# Patient Record
Sex: MALE | Race: WHITE | NOT HISPANIC OR LATINO | Employment: OTHER | URBAN - METROPOLITAN AREA
[De-identification: names, ages, dates, MRNs, and addresses within clinical notes are randomized per-mention and may not be internally consistent; named-entity substitution may affect disease eponyms.]

---

## 2017-01-01 ENCOUNTER — OFFICE VISIT (OUTPATIENT)
Dept: SURGERY | Facility: CLINIC | Age: 32
End: 2017-01-01
Payer: MEDICAID

## 2017-01-01 VITALS — HEART RATE: 97 BPM | DIASTOLIC BLOOD PRESSURE: 64 MMHG | SYSTOLIC BLOOD PRESSURE: 89 MMHG

## 2017-01-01 DIAGNOSIS — R19.09 PRESACRAL MASS: Primary | ICD-10-CM

## 2017-01-01 DIAGNOSIS — K60.3 TRANSSPHINCTERIC ANAL FISTULA: ICD-10-CM

## 2017-01-01 PROCEDURE — 99999 PR PBB SHADOW E&M-EST. PATIENT-LVL III: CPT | Mod: PBBFAC,,, | Performed by: COLON & RECTAL SURGERY

## 2017-01-01 PROCEDURE — 99213 OFFICE O/P EST LOW 20 MIN: CPT | Mod: PBBFAC | Performed by: COLON & RECTAL SURGERY

## 2017-01-01 PROCEDURE — 99213 OFFICE O/P EST LOW 20 MIN: CPT | Mod: S$PBB,,, | Performed by: COLON & RECTAL SURGERY

## 2017-03-23 ENCOUNTER — OFFICE VISIT (OUTPATIENT)
Dept: SURGERY | Facility: CLINIC | Age: 32
End: 2017-03-23
Payer: MEDICAID

## 2017-03-23 VITALS
OXYGEN SATURATION: 97 % | RESPIRATION RATE: 18 BRPM | HEART RATE: 73 BPM | DIASTOLIC BLOOD PRESSURE: 60 MMHG | HEIGHT: 67 IN | SYSTOLIC BLOOD PRESSURE: 90 MMHG

## 2017-03-23 DIAGNOSIS — K62.89: Primary | ICD-10-CM

## 2017-03-23 DIAGNOSIS — K60.0 ACUTE ANAL FISSURE: ICD-10-CM

## 2017-03-23 PROCEDURE — 99214 OFFICE O/P EST MOD 30 MIN: CPT | Mod: S$GLB,,, | Performed by: EMERGENCY MEDICINE

## 2017-03-23 RX ORDER — FAMOTIDINE 20 MG/1
20 TABLET, FILM COATED ORAL 2 TIMES DAILY
Status: ON HOLD | COMMUNITY
End: 2018-01-01 | Stop reason: HOSPADM

## 2017-03-23 RX ORDER — CIPROFLOXACIN 500 MG/5ML
500 KIT ORAL 2 TIMES DAILY
Qty: 70 ML | Refills: 0 | Status: SHIPPED | OUTPATIENT
Start: 2017-03-23 | End: 2017-03-30

## 2017-03-23 RX ORDER — QUETIAPINE FUMARATE 50 MG/1
400 TABLET, FILM COATED ORAL NIGHTLY
COMMUNITY

## 2017-03-23 RX ORDER — LIDOCAINE HYDROCHLORIDE 20 MG/ML
JELLY TOPICAL 3 TIMES DAILY
Qty: 30 ML | Refills: 5 | Status: SHIPPED | OUTPATIENT
Start: 2017-03-23 | End: 2017-11-07

## 2017-03-23 RX ORDER — AMOXICILLIN 250 MG
1 CAPSULE ORAL DAILY
COMMUNITY

## 2017-03-23 NOTE — MR AVS SNAPSHOT
[facility]  [address], [address]  [facility] [address]  Phone: [phone]  Fax: [phone]                  Vinayak Castrejon   3/23/2017 4:15 PM   Office Visit    Description:  Male : 1985   Provider:  ERMA Lynn MD   Department:  [facility]           Reason for Visit     Cyst           Diagnoses this Visit        Comments    Granuloma of rectum    -  Primary     Acute anal fissure                To Do List           Goals (5 Years of Data)     None      Follow-Up and Disposition     Return if symptoms worsen or fail to improve.       These Medications        Disp Refills Start End    lidocaine HCL 2% (XYLOCAINE) 2 % jelly 30 mL 5 3/23/2017     Apply topically 3 (three) times daily. Apply to the rectum prn. - Topical (Top)    Pharmacy: [facility] [address] [place] [address] [id number] Ph #: [phone]       ciprofloxacin (CIPRO) 500 mg/5 mL suspension 70 mL 0 3/23/2017 3/30/2017    Take 5 mLs (500 mg total) by mouth 2 (two) times daily. - Oral    Pharmacy: [facility] [address] [place] [address] [id number] Ph #: [phone]         [facility]     [facility] Care [facility] -  Assistance  Registered nurses in the [facility] provide clinical advisement, health education, appointment booking, and other advisory services.  Call for this free service at [phone].             Medications           Message regarding Medications     Verify the changes and/or additions to your medication regime listed below are the same as discussed with your clinician today.  If any of these changes or additions are incorrect, please notify your healthcare provider.        START taking these NEW medications        Refills    lidocaine HCL 2% (XYLOCAINE) 2 % jelly 5    Sig: Apply topically 3 (three) times daily. Apply to the rectum prn.    Class: Normal    Route: Topical (Top)    ciprofloxacin (CIPRO) 500 mg/5 mL suspension 0     Sig: Take 5 mLs (500 mg total) by mouth 2 (two) times daily.    Class: Normal    Route: Oral      STOP taking these medications     BANOPHEN 25 mg capsule Take 1 capsule by mouth as needed.    docusate sodium (COLACE) 100 MG capsule Take 1 capsule (100 mg total) by mouth 2 (two) times daily.    hydrocodone-acetaminophen 5-325mg (NORCO) 5-325 mg per tablet Take 1 tablet by mouth as needed.    L. RHAMNOSUS GG/INULIN (CULTURELLE PROBIOTICS ORAL) Take 1 capsule by mouth continuous prn.    temazepam (RESTORIL) 15 mg Cap Take 2 capsules by mouth as needed.           Verify that the below list of medications is an accurate representation of the medications you are currently taking.  If none reported, the list may be blank. If incorrect, please contact your healthcare provider. Carry this list with you in case of emergency.           Current Medications     alprazolam (XANAX) 0.25 MG tablet Take 1 tablet by mouth as needed.    apixaban (ELIQUIS) 5 mg Tab Take 5 mg by mouth 2 (two) times daily.    bisacodyl (DULCOLAX) 5 mg EC tablet Take 10 mg by mouth 3 (three) times daily as needed for Constipation.    carvedilol (COREG) 3.125 MG tablet Take 3.125 mg by mouth 2 (two) times daily.    digoxin (LANOXIN) 250 mcg tablet Take 1 tablet (0.25 mg total) by mouth once daily.    famotidine (PEPCID) 20 MG tablet Take 20 mg by mouth 2 (two) times daily.    furosemide (LASIX) 20 MG tablet Take 2 tablets (40 mg total) by mouth 2 (two) times daily.    ondansetron (ZOFRAN-ODT) 4 MG TbDL Take 1 tablet (4 mg total) by mouth every 8 (eight) hours as needed (nausea and vomiting).    quetiapine (SEROQUEL) 50 MG tablet Take 150 mg by mouth 2 (two) times daily.    senna-docusate 8.6-50 mg (PERICOLACE) 8.6-50 mg per tablet Take 1 tablet by mouth once daily.    ciprofloxacin (CIPRO) 500 mg/5 mL suspension Take 5 mLs (500 mg total) by mouth 2 (two) times daily.    lidocaine HCL 2% (XYLOCAINE) 2 % jelly Apply topically 3 (three) times daily. Apply to  "the rectum prn.           Clinical Reference Information           Your Vitals Were     BP Pulse Resp Height SpO2       90/60 (BP Location: Left arm, Patient Position: Sitting, BP Method: Manual) 73 18 5' 7" (1.702 m) 97%       Blood Pressure          Most Recent Value    BP  90/60      Allergies as of 3/23/2017     No Known Allergies      Immunizations Administered on Date of Encounter - 3/23/2017     None      [facility] Sign-Up     Activating your [facility] account is as easy as 1-2-3!     1) Visit [email or web address], select Sign Up Now, enter this activation code and your date of birth, then select Next.  [email or web address]  Expires: 5/7/2017  5:08 PM      2) Create a username and password to use when you visit [facility] in the future and select a security question in case you lose your password and select Next.    3) Enter your e-mail address and click Sign Up!    Additional Information  If you have questions, please e-mail [email or web address] or call [phone] to talk to our [facility] staff. Remember, [facility] is NOT to be used for urgent needs. For medical emergencies, dial 911.         Instructions    1.  Rx Lidocaine Jelly apply to rectum/anus prn  2.  Rx Cortisone 10 cream apply BID to rectum/anus  3.  Silvadene Cream (patient has at home)  Apply to "bump" near rectum  4.  Rx Cipro 500 mg/5ml twice daily x 7 days  5.  Call our office with any questions/concerns  6.  Return to clinic as needed       Smoking Cessation     If you would like to quit smoking:   You may be eligible for free services if you are a [place] resident and started smoking cigarettes before September 1, 1988.  Call the [employer]oking [employer]ess[employer] ([employer]) toll free at (209) 648-6546 or (337) 283-2312.   Call 7-800-QUIT-NOW if you do not meet the above criteria.            Language Assistance Services     ATTENTION: Language assistance services are available, free of charge. Please call [phone].      ATENCIÓN: Si aleah merino " a bentley disposición servicios gratuitos de asistencia lingüística. Jean-Pierre al [phone].     RUCHI Ý: N?u b?n nói Ti?ng Vi?t, có các d?ch v? h? tr? ngôn ng? mi?n phí dành cho b?n. G?i s? [phone].         [facility] complies with applicable Federal civil rights laws and does not discriminate on the basis of race, color, national origin, age, disability, or sex.

## 2017-03-23 NOTE — PATIENT INSTRUCTIONS
"1.  Rx Lidocaine Jelly apply to rectum/anus prn  2.  Rx Cortisone 10 cream apply BID to rectum/anus  3.  Silvadene Cream (patient has at home)  Apply to "bump" near rectum  4.  Rx Cipro 500 mg/5ml twice daily x 7 days  5.  Call our office with any questions/concerns  6.  Return to clinic as needed  "

## 2017-03-23 NOTE — PROGRESS NOTES
"History & Physical    SUBJECTIVE:     History of Present Illness:  Patient is a 32 y.o. male presents with c/o rectal burning and tearing sensation x several weeks.  The pain is constant.  Patient is accompanied by his mother and a caregiver.  Patient's mother advises of a "bump" near the rectum.  That came up several weeks ago.  Patient was recently on Bactrim and a z-rafat for this problem without relief of symptoms.  Patient has a heart EF of 5 % and is on Hospice.  This is a patient who is very well known to our practice.  I have reviewed patient's medical history, surgical history, social history, medications and allergies with patient, his mother and caregiver.       Chief Complaint   Patient presents with    Cyst       Review of patient's allergies indicates:  No Known Allergies    Current Outpatient Prescriptions   Medication Sig Dispense Refill    alprazolam (XANAX) 0.25 MG tablet Take 1 tablet by mouth as needed.  0    apixaban (ELIQUIS) 5 mg Tab Take 5 mg by mouth 2 (two) times daily.      bisacodyl (DULCOLAX) 5 mg EC tablet Take 10 mg by mouth 3 (three) times daily as needed for Constipation.      carvedilol (COREG) 3.125 MG tablet Take 3.125 mg by mouth 2 (two) times daily.      digoxin (LANOXIN) 250 mcg tablet Take 1 tablet (0.25 mg total) by mouth once daily. 30 tablet 0    famotidine (PEPCID) 20 MG tablet Take 20 mg by mouth 2 (two) times daily.      furosemide (LASIX) 20 MG tablet Take 2 tablets (40 mg total) by mouth 2 (two) times daily. 60 tablet 0    ondansetron (ZOFRAN-ODT) 4 MG TbDL Take 1 tablet (4 mg total) by mouth every 8 (eight) hours as needed (nausea and vomiting). 30 tablet 0    quetiapine (SEROQUEL) 50 MG tablet Take 150 mg by mouth 2 (two) times daily.      senna-docusate 8.6-50 mg (PERICOLACE) 8.6-50 mg per tablet Take 1 tablet by mouth once daily.      ciprofloxacin (CIPRO) 500 mg/5 mL suspension Take 5 mLs (500 mg total) by mouth 2 (two) times daily. 70 mL 0    lidocaine " "HCL 2% (XYLOCAINE) 2 % jelly Apply topically 3 (three) times daily. Apply to the rectum prn. 30 mL 5     No current facility-administered medications for this visit.        Past Medical History:   Diagnosis Date    Atrial fibrillation     Cardiomyopathy     CHF (congestive heart failure)     Friedreich's ataxia     General anesthetics causing adverse effect in therapeutic use     Nystagmus     Scoliosis      Past Surgical History:   Procedure Laterality Date    acf      mutliple perirectal abscess      dec 2015    perianal abscess  02/2015    POSTERIOR CERVICAL LAMINECTOMY       Family History   Problem Relation Age of Onset    ADD / ADHD Mother     ADD / ADHD Sister     ADD / ADHD Sister      Social History   Substance Use Topics    Smoking status: Current Every Day Smoker     Packs/day: 0.50     Years: 13.00     Types: Cigarettes    Smokeless tobacco: None    Alcohol use No        Review of Systems:  Review of Systems   Constitutional: Negative for activity change, appetite change, chills, diaphoresis, fatigue and fever.   HENT: Negative for congestion, postnasal drip, rhinorrhea, sinus pressure, sneezing and sore throat.    Eyes: Negative for pain, discharge, redness and itching.   Respiratory: Negative for cough, choking, shortness of breath, wheezing and stridor.    Cardiovascular: Negative for chest pain and leg swelling.        No SOB     Gastrointestinal: Positive for anal bleeding (secondary to anal fissure and "bump" near rectum) and rectal pain. Negative for abdominal pain, constipation, diarrhea, nausea and vomiting.   Musculoskeletal: Positive for arthralgias, back pain, gait problem, myalgias and neck pain.   Skin: Positive for pallor. Negative for color change, rash and wound.   Neurological: Positive for weakness. Negative for dizziness, facial asymmetry, light-headedness and headaches.   Psychiatric/Behavioral: Positive for hallucinations (per mother and caregiver). Negative for " "agitation and confusion. The patient is not hyperactive.        OBJECTIVE:     Vital Signs (Most Recent)  Pulse: 73 (03/23/17 1618)  Resp: 18 (03/23/17 1618)  BP: 90/60 (03/23/17 1618)  SpO2: 97 % (03/23/17 1618)  5' 7" (1.702 m)        Physical Exam:  Physical Exam   Constitutional: He is oriented to person, place, and time. No distress.   Cachectic and in a motorized wheelchair.   HENT:   Head: Normocephalic and atraumatic.   Right Ear: External ear normal.   Left Ear: External ear normal.   Nose: Nose normal.   Eyes: Conjunctivae and EOM are normal. Pupils are equal, round, and reactive to light. No scleral icterus.   Neck: Normal range of motion. Neck supple. No tracheal deviation present.   Cardiovascular:   Patient has a 5% EF and is on hospice.   Pulmonary/Chest: Effort normal and breath sounds normal. No stridor. No respiratory distress. He has no wheezes. He has no rales.   Abdominal: Soft. Bowel sounds are normal. He exhibits no distension. There is no tenderness.   Genitourinary:   Genitourinary Comments: Pencil point area of granulation tissue at the 11 o'clock prone position.  Patient has a anal fissure at 12 o'clock prone position.   Musculoskeletal: He exhibits deformity. He exhibits no edema or tenderness.   Decreased ROM.   Neurological: He is alert and oriented to person, place, and time. He displays abnormal reflex. He exhibits abnormal muscle tone. Coordination abnormal.   Skin: Skin is warm and dry. No rash noted. He is not diaphoretic. No erythema. There is pallor.   Multiple tattoos.   Psychiatric: He has a normal mood and affect. His behavior is normal. Judgment and thought content normal.   Nursing note and vitals reviewed.      Laboratory      Diagnostic Results:      ASSESSMENT/PLAN:     1.  Anal Fissure @ 12 o'clock prone position  2.  Perianal Granuloma @ 11 o'clock prone position  3.  H/o Alma's Ataxia  4.  Heart EF 5 %  5.  H/o Perianal abscess requiring surgery  6.  H/o " Cardiomyopathy  7.  H/o A-Fib  8.  H/o Scoliosis    PLAN:Plan     1.  Rx Lidocaine Jelly apply to rectum/anus prn  2.  Rx Cortisone 10 cream apply to rectum/anus BID  3.  Rx Cipro 500 mg/5 ml BID x 7 days.  4.  Patient advised to apply silvadene to the perianal granuloma daily.  5.  Call our office with any questions/concerns.  6.  RTC prn.  7.  Dr Lynn consulted and advised of above.

## 2017-03-28 ENCOUNTER — TELEPHONE (OUTPATIENT)
Dept: SURGERY | Facility: CLINIC | Age: 32
End: 2017-03-28

## 2017-03-28 NOTE — TELEPHONE ENCOUNTER
I spoke with patient's mother Mrs. Lemus and she advised the Rx Cipro suspension is on back order.  She has called around to several pharmacies in the area since Thursday 3/23/2017.  The patient is with [facility] and a call was placed to their Pharmacy[facility] [phone].  Rx Augmentin 400-57 mg/5mL, 10 mL po BID x 7 days per Dr Lynn.  Patient's mother was notified of the Rx called in.

## 2017-06-07 ENCOUNTER — OFFICE VISIT (OUTPATIENT)
Dept: SURGERY | Facility: CLINIC | Age: 32
End: 2017-06-07
Payer: MEDICAID

## 2017-06-07 VITALS — HEIGHT: 67 IN | WEIGHT: 135 LBS | BODY MASS INDEX: 21.19 KG/M2

## 2017-06-07 DIAGNOSIS — K60.3 FISTULA-IN-ANO: Primary | ICD-10-CM

## 2017-06-07 PROCEDURE — 99214 OFFICE O/P EST MOD 30 MIN: CPT | Mod: S$GLB,,, | Performed by: SURGERY

## 2017-06-07 NOTE — PROGRESS NOTES
"History & Physical    SUBJECTIVE:     History of Present Illness:  Patient is a 32 y.o. male presents with chronic perianal drainage.  Patient accompanied by mother and caretaker today.  Has medical history as listed below and history of recurrent perianal infections requiring incision and drainage.  Mother and caretaker described a waxing and waning course of recurrent swelling and ongoing persistent mucousy drainage from the perianal area.  Patient has history of multiple perirectal abscesses status post I&D.  Admit to previous history of severe constipation that appears to be improving with daily MiraLAX and other laxative regimen.  They deny any fever or chills.  The patient denies any pain currently.  The mother however does report he intermittently complains of pain often with associated swelling in the area.  They also admit to intermittent episodes of bright red blood drainage from the chronic perianal opening.  The patient is becoming frustrated with recurrent infections.  Denies any issues with incontinence    Chief Complaint   Patient presents with    Other     "Bump" On rectum. Patient's mother thinks it is a boil. Had surgery in 2016 with Dr. Lynn       Review of patient's allergies indicates:  No Known Allergies    Current Outpatient Prescriptions   Medication Sig Dispense Refill    alprazolam (XANAX) 0.25 MG tablet Take 1 tablet by mouth as needed.  0    apixaban (ELIQUIS) 5 mg Tab Take 5 mg by mouth 2 (two) times daily.      bisacodyl (DULCOLAX) 5 mg EC tablet Take 10 mg by mouth 3 (three) times daily as needed for Constipation.      carvedilol (COREG) 3.125 MG tablet Take 3.125 mg by mouth 2 (two) times daily.      digoxin (LANOXIN) 250 mcg tablet Take 1 tablet (0.25 mg total) by mouth once daily. 30 tablet 0    famotidine (PEPCID) 20 MG tablet Take 20 mg by mouth 2 (two) times daily.      furosemide (LASIX) 20 MG tablet Take 2 tablets (40 mg total) by mouth 2 (two) times daily. 60 tablet 0 " "   lidocaine HCL 2% (XYLOCAINE) 2 % jelly Apply topically 3 (three) times daily. Apply to the rectum prn. 30 mL 5    ondansetron (ZOFRAN-ODT) 4 MG TbDL Take 1 tablet (4 mg total) by mouth every 8 (eight) hours as needed (nausea and vomiting). 30 tablet 0    quetiapine (SEROQUEL) 50 MG tablet Take 150 mg by mouth 2 (two) times daily.      senna-docusate 8.6-50 mg (PERICOLACE) 8.6-50 mg per tablet Take 1 tablet by mouth once daily.       No current facility-administered medications for this visit.        Past Medical History:   Diagnosis Date    Atrial fibrillation     Cardiomyopathy     CHF (congestive heart failure)     Friedreich's ataxia     General anesthetics causing adverse effect in therapeutic use     Nystagmus     Scoliosis      Past Surgical History:   Procedure Laterality Date    acf      mutliple perirectal abscess      dec 2015    perianal abscess  02/2015    POSTERIOR CERVICAL LAMINECTOMY       Family History   Problem Relation Age of Onset    ADD / ADHD Mother     ADD / ADHD Sister     ADD / ADHD Sister      Social History   Substance Use Topics    Smoking status: Current Every Day Smoker     Packs/day: 0.50     Years: 13.00     Types: Cigarettes    Smokeless tobacco: Not on file    Alcohol use No        Review of Systems:  Review of Systems   Constitutional: Negative for chills and fever.   Gastrointestinal: Positive for anal bleeding, constipation and rectal pain. Negative for abdominal distention, abdominal pain, diarrhea, nausea and vomiting.   Hematological: Bruises/bleeds easily.       OBJECTIVE:     Vital Signs (Most Recent)     5' 7" (1.702 m)  61.2 kg (135 lb)     Physical Exam:    General: Alert and oriented, No acute distress.   Neck: Supple, Non-tender, No jugular venous distention.  No mass or LAD  Respiratory: Respirations are non-labored, Symmetrical chest wall expansion, No chest wall tenderness.   Cardiovascular: Irregularly irregular with 2+ symmetrical radial " pulses.   Gastrointestinal: Soft, Non-tender, Non-distended, No organomegaly.   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  Mucousy drainage on palpation of the area and perianal region.  Digital rectal exam normal tone.           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  Given the complexity of the situation with concurrent medical problems patient best served to be evaluated by colorectal surgery for definitive management of this.  Extensive conversation with the patient and family present at bedside regarding the natural disease process and management options moving forward.  There interested in pursuing surgical intervention therefore we will help facilitate referral.  Follow-up with me as needed

## 2017-08-08 ENCOUNTER — OFFICE VISIT (OUTPATIENT)
Dept: SURGERY | Facility: CLINIC | Age: 32
End: 2017-08-08
Payer: MEDICAID

## 2017-08-08 VITALS
WEIGHT: 134.94 LBS | BODY MASS INDEX: 21.18 KG/M2 | DIASTOLIC BLOOD PRESSURE: 70 MMHG | SYSTOLIC BLOOD PRESSURE: 83 MMHG | HEIGHT: 67 IN | HEART RATE: 93 BPM

## 2017-08-08 DIAGNOSIS — K60.3 FISTULA-IN-ANO: Primary | ICD-10-CM

## 2017-08-08 PROCEDURE — 3008F BODY MASS INDEX DOCD: CPT | Mod: ,,, | Performed by: COLON & RECTAL SURGERY

## 2017-08-08 PROCEDURE — 99203 OFFICE O/P NEW LOW 30 MIN: CPT | Mod: S$PBB,,, | Performed by: COLON & RECTAL SURGERY

## 2017-08-08 PROCEDURE — 99999 PR PBB SHADOW E&M-EST. PATIENT-LVL III: CPT | Mod: PBBFAC,,, | Performed by: COLON & RECTAL SURGERY

## 2017-08-08 PROCEDURE — 99213 OFFICE O/P EST LOW 20 MIN: CPT | Mod: PBBFAC | Performed by: COLON & RECTAL SURGERY

## 2017-08-08 NOTE — LETTER
August 11, 2017      Jackson Dos Santos MD  [address] [place] [address]           [facility]  [address]  [facility] [address]  Phone: [phone]          Patient: Vinayak Castrejon   MR Number: [id number]   YOB: 1985   Date of Visit: 8/8/2017       Dear Dr. Jackson Dos Santos:    Thank you for referring Vinayak Castrejon to me for evaluation. Attached you will find relevant portions of my assessment and plan of care.    If you have questions, please do not hesitate to call me. I look forward to following Vinayak Castrejon along with you.    Sincerely,        Enclosure  CC:  No Recipients    If you would like to receive this communication electronically, please contact [email or web address] or [phone] to request more information on [employer] access.    For providers and/or their staff who would like to refer a patient to [facility], please contact us through our one-stop-shop provider referral line, [facility], at [phone].    If you feel you have received this communication in error or would no longer like to receive these types of communications, please e-mail [email or web address]

## 2017-08-08 NOTE — PROGRESS NOTES
History of horseshoe abscess   Operated 2016.  Now with recurrent abscess.  Drainage from wounds    Multiple prior operations per pt and mom  Last one:    DATE OF PROCEDURE:  05/27/2016     PREOPERATIVE DIAGNOSES:  Perianal/horseshoe perirectal abscess.     POSTOPERATIVE DIAGNOSES:  Perianal/horseshoe perirectal abscess.     PROCEDURES PERFORMED:  Complex incision and drainage with excision of sacral and   perirectal abscess and fistulous tract.     Procedure under general anesthesia.     INDICATION FOR PROCEDURE:  This is a young male who presents with recurrent   severe abscess at the rectum and perianal area.  It begins in the 5 o'clock   prone position and extends circumferentially around the left side of the prone   buttock into and above the sacral prominence into the sacral cleft.     PROCEDURE IN DETAIL:  The patient was brought to the Operating Room and remained   on the operative roller.  Following induction of general anesthesia, an   endotracheal tube was placed and secured and anesthesia maintained by that route.  The   patient was then moved into a prone position on the operative bed and then into   prone jackknife.  Buttocks were carefully spread with tape and prepped and   draped.  A large abscess area is noted at the 5 o'clock prone position and a   probe was inserted at this point and circumferentially around the left perimeter   up to and beyond the sacral prominence.  A fistula probe is inserted and a #15   blade was used to incise the skin from the sacrum around the left side of the   buttock to the 6 o'clock position.  A groove probe is removed.  Allises are   applied to the skin borders.  Electrocautery was used to dissect down through   all layers of the skin into the fistulous tract.  Fistulous tract was excised   using #15 blade.  Curettes were used to clean the entire tract after specimens   are submitted for culture and sensitivity.  The tract and contents of the   fistula are submitted  for histopathology.  Hemostasis was obtained using   electrocautery.  The skin was reapproximated and 2-0 chromic sutures are   individually used to pass through skin through the depth of the tract and back   through skin in a vertical mattress type arrangement until the entire incision   is closed.  Marcaine with epinephrine was injected in a four quadrant position   to assist with postoperative pain and the wounds were dressed.  The patient was   brought back to a prone position and then moved onto the operative roller in a   supine position, awakened and extubated.  All lap, sponge, instrument and needle   counts were correct.  The blood loss is minimal.  The patient tolerated the   procedure well and was moved to Recovery in good condition.    Past Medical History:   Diagnosis Date    Atrial fibrillation     Cardiomyopathy     CHF (congestive heart failure)     Friedreich's ataxia     General anesthetics causing adverse effect in therapeutic use     Nystagmus     Scoliosis      Past Surgical History:   Procedure Laterality Date    acf      mutliple perirectal abscess      dec 2015    perianal abscess  02/2015    POSTERIOR CERVICAL LAMINECTOMY       Review of patient's allergies indicates:  No Known Allergies    Current Outpatient Prescriptions on File Prior to Visit   Medication Sig Dispense Refill    alprazolam (XANAX) 0.25 MG tablet Take 1 tablet by mouth as needed.  0    apixaban (ELIQUIS) 5 mg Tab Take 5 mg by mouth 2 (two) times daily.      bisacodyl (DULCOLAX) 5 mg EC tablet Take 10 mg by mouth 3 (three) times daily as needed for Constipation.      carvedilol (COREG) 3.125 MG tablet Take 3.125 mg by mouth 2 (two) times daily.      famotidine (PEPCID) 20 MG tablet Take 20 mg by mouth 2 (two) times daily.      furosemide (LASIX) 20 MG tablet Take 2 tablets (40 mg total) by mouth 2 (two) times daily. 60 tablet 0    lidocaine HCL 2% (XYLOCAINE) 2 % jelly Apply topically 3 (three) times daily.  "Apply to the rectum prn. 30 mL 5    ondansetron (ZOFRAN-ODT) 4 MG TbDL Take 1 tablet (4 mg total) by mouth every 8 (eight) hours as needed (nausea and vomiting). 30 tablet 0    quetiapine (SEROQUEL) 50 MG tablet Take 150 mg by mouth 2 (two) times daily.      senna-docusate 8.6-50 mg (PERICOLACE) 8.6-50 mg per tablet Take 1 tablet by mouth once daily.      digoxin (LANOXIN) 250 mcg tablet Take 1 tablet (0.25 mg total) by mouth once daily. 30 tablet 0     No current facility-administered medications on file prior to visit.        Family History   Problem Relation Age of Onset    ADD / ADHD Mother     ADD / ADHD Sister     ADD / ADHD Sister      Social History     Social History    Marital status: Single     Spouse name: N/A    Number of children: N/A    Years of education: N/A     Occupational History    Not on file.     Social History Main Topics    Smoking status: Current Every Day Smoker     Packs/day: 0.50     Years: 13.00     Types: Cigarettes    Smokeless tobacco: Not on file    Alcohol use No    Drug use:      Frequency: 3.0 times per week     Types: Marijuana    Sexual activity: No     Other Topics Concern    Not on file     Social History Narrative    No narrative on file     ROS:  GENERAL: No fever, chills, fatigability or weight loss.  Integument:  No rashes, redness, icterus  CHEST: Denies MARIN, cyanosis, wheezing, cough and sputum production.  CARDIOVASCULAR: Denies chest pain, PND, orthopnea or reduced exercise tolerance.  GI:  Denies abd pain, dysphagia, nausea, vomiting, no hematemesis, no rectal pain  : Denies burning on urination, no hematuria, no bacteriuria  MSK:  No deformities, swelling, joint pain swelling  Neurologic:  No HAs, seizures, weakness, paresthesias, gait problems     Pt in NAD  BP (!) 83/70 (BP Location: Left arm, Patient Position: Sitting)   Pulse 93   Ht 5' 7.01" (1.702 m)   Wt 61.2 kg (134 lb 14.7 oz)   BMI 21.13 kg/m²   AT NC EOMI  Neck supple, trachea " midline  Chest symmetric, no retractions  Breathing comfortably  Rectal   No fluctuance.  No mass.  No granulation tissue  Scarring noted below         H/o complex fistula / abscess disease    Recommend  Check MRI pelvis as well  Possible EUA

## 2017-08-14 DIAGNOSIS — K60.3 ANAL FISTULA: Primary | ICD-10-CM

## 2017-08-23 ENCOUNTER — HOSPITAL ENCOUNTER (OUTPATIENT)
Dept: PREADMISSION TESTING | Facility: HOSPITAL | Age: 32
Discharge: HOME | End: 2017-08-23
Attending: ANESTHESIOLOGY
Payer: MEDICAID

## 2017-08-23 VITALS
SYSTOLIC BLOOD PRESSURE: 100 MMHG | OXYGEN SATURATION: 98 % | DIASTOLIC BLOOD PRESSURE: 60 MMHG | HEIGHT: 67 IN | HEART RATE: 87 BPM | RESPIRATION RATE: 16 BRPM | WEIGHT: 135 LBS | TEMPERATURE: 98 F | BODY MASS INDEX: 21.19 KG/M2

## 2017-08-23 RX ORDER — POLYETHYLENE GLYCOL 3350 17 G/17G
POWDER, FOR SOLUTION ORAL
Status: ON HOLD | COMMUNITY
End: 2018-01-01 | Stop reason: HOSPADM

## 2017-08-23 NOTE — DISCHARGE INSTRUCTIONS
Your surgery has been scheduled for:__________________________________________    You should report to:  ____[facility], located on the [place] side of the first floor of the           [facility] ([phone])  ____[facility], located on the [facility] side of the            Second floor of the [facility] ([phone])  ____3rd Floor [facility] located on the [facility] side of the [facility] [phone]  Please Note   - Tell your doctor if you take Aspirin, products containing Aspirin, herbal medications  or blood thinners, such as Coumadin, Ticlid, or Plavix.  (Consult your provider regarding holding or stopping before surgery).  - Arrange for someone to drive you home following surgery.  You will not be allowed to leave the surgical facility alone or drive yourself home following sedation and anesthesia.  Before Surgery  - Stop taking all herbal medications 14days prior to surgery  - No Motrin/Advil (Ibuprofen) 7 days before surgery  - No Aleve (Naproxen) 7 days before surgery  - Stop Taking Asprin, products containing Asprin _____days before surgery  - Stop taking blood thinners_______days before surgery  - Refrain from drinking alcoholic beverages for 24hours before and after surgery  - Stop or limit smoking _________days before surgery  Night before Surgery  - DO NOT EAT OR DRINK ANYTHING AFTER MIDNIGHT, INCLUDING GUM, HARD CANDY, MINTS, OR CHEWING TOBACCO.  - Take a shower or bath (shower is recommended).  Bathe with Hibiclens soap or an antibacterial soap from the neck down.  If not supplied by your surgeon, hibiclens soap will need to be purchased over the counter in pharmacy.  Rinse soap off thoroughly.  - Shampoo your hair with your regular shampoo  The Day of Surgery  - Take another bath or shower with hibiclens or any antibacterial soap, to reduce the chance of infection.  - Take heart and blood  pressure medications with a small sip of water, as advised by the perioperative team.  - Do not take fluid pills  - You may brush your teeth and rinse your mouth, but do not swall any additional water.   - Do not apply perfumes, powder, body lotions or deodorant on the day of surgery.  - Nail polish should be removed.  - Do not wear makeup or moisturizer  - Wear comfortable clothes, such as a button front shirt and loose fitting pants.  - Leave all jewelry, including body piercings, and valuables at home.    - Bring any devices you will neeed after surgery such as crutches or canes.  - If you have sleep apnea, please bring your CPAP machine  In the event that your physical condition changes including the onset of a cold or respiratory illness, or if you have to delay or cancel your surgery, please notify your surgeon.  Anesthesia: General Anesthesia  Youre due to have surgery. During surgery, youll be given medication called anesthesia. (It is also called anesthetic.) This will keep you comfortable and pain-free. Your anesthesia provider will use general anesthesia. This sheet tells you more about it.  What is general anesthesia?     You are watched continuously during your procedure by the anesthesia provider   General anesthesia puts you into a state like deep sleep. It goes into the bloodstream (IV anesthetics), into the lungs (gas anesthetics), or both. You feel nothing during the procedure. You will not remember it. During the procedure, the anesthesia provider monitors you continuously. He or she checks your heart rate and rhythm, blood pressure, breathing, and blood oxygen.  · IV Anesthetics. IV anesthetics are given through an IV line in your arm. Theyre often given first. This is so you are asleep before a gas anesthetic is started. Some kinds of IV anesthetics relieve pain. Others relax you. Your doctor will decide which kind is best in your case.  · Gas Anesthetics. Gas anesthetics are breathed into  the lungs. They are often used to keep you asleep. They can be given through a facemask or a tube placed in your larynx or trachea (breathing tube).  ? If you have a facemask, your anesthesia provider will most likely place it over your nose and mouth while youre still awake. Youll breathe oxygen through the mask as your IV anesthetic is started. Gas anesthetic may be added through the mask.  ? If you have a tube in the larynx or trachea, it will be inserted into your throat after youre asleep.  Anesthesia tools and medications  You will likely have:  · IV anesthetics. These are put into an IV line into your bloodstream.  · Gas anesthetics. You breathe these anesthetics into your lungs, where they pass into your bloodstream.  · Pulse oximeter. This is a small clip that is attached to the end of your finger. This measures your blood oxygen level.  · Electrocardiography leads (electrodes). These are small sticky pads that are placed on your chest. They record your heart rate and rhythm.  · Blood pressure cuff. This reads your blood pressure.  Risks and possible complications  General anesthesia has some risks. These include:  · Breathing problems  · Nausea and vomiting  · Sore throat or hoarseness (usually temporary)  · Allergic reaction to the anesthetic  · Irregular heartbeat (rare)  · Cardiac arrest (rare)   Anesthesia safety  · Follow all instructions you are given for how long not to eat or drink before your procedure.  · Be sure your doctor knows what medications and drugs you take. This includes over-the-counter medications, herbs, supplements, alcohol or other drugs. You will be asked when those were last taken.  · Have an adult family member or friend drive you home after the procedure.  · For the first 24 hours after your surgery:  ? Do not drive or use heavy equipment.  ? Have a trusted family member or spouse make important decisions or sign documents.  ? Avoid alcohol.  ? Have a responsible adult stay  with you. He or she can watch for problems and help keep you safe.  Date Last Reviewed: 10/16/2014  © 0861-1585 [employer]. [address] [place] [address], [place] [address]. All rights reserved. This information is not intended as a substitute for professional medical care. Always follow your healthcare professional's instructions.

## 2017-08-24 DIAGNOSIS — K60.3 ANAL FISTULA: ICD-10-CM

## 2017-08-24 DIAGNOSIS — I48.91 ATRIAL FIBRILLATION WITH RVR: Primary | ICD-10-CM

## 2017-08-30 ENCOUNTER — TELEPHONE (OUTPATIENT)
Dept: SURGERY | Facility: CLINIC | Age: 32
End: 2017-08-30

## 2017-08-30 NOTE — TELEPHONE ENCOUNTER
Dr Mendoza wants to postpone pt's surgery until after he get an MRI. Called and left message yesterday for pt's mother to return my call. Left message it was about her sons surgery, there has been a change. Today I have left 5 messages on cell and home phone number. I have not gotten a reply.

## 2017-09-06 ENCOUNTER — HOSPITAL ENCOUNTER (OUTPATIENT)
Dept: RADIOLOGY | Facility: HOSPITAL | Age: 32
Discharge: HOME | End: 2017-09-06
Attending: COLON & RECTAL SURGERY
Payer: MEDICAID

## 2017-09-06 ENCOUNTER — OFFICE VISIT (OUTPATIENT)
Dept: SURGERY | Facility: CLINIC | Age: 32
End: 2017-09-06
Payer: MEDICAID

## 2017-09-06 VITALS — DIASTOLIC BLOOD PRESSURE: 49 MMHG | HEIGHT: 67 IN | SYSTOLIC BLOOD PRESSURE: 83 MMHG | HEART RATE: 97 BPM

## 2017-09-06 DIAGNOSIS — K60.3 FISTULA-IN-ANO: ICD-10-CM

## 2017-09-06 DIAGNOSIS — K61.2 ABSCESS OF ANAL AND RECTAL REGIONS: Primary | ICD-10-CM

## 2017-09-06 DIAGNOSIS — K60.3 ANAL FISTULA: ICD-10-CM

## 2017-09-06 PROCEDURE — A9585 GADOBUTROL INJECTION: HCPCS | Performed by: COLON & RECTAL SURGERY

## 2017-09-06 PROCEDURE — 25500020 PHARM REV CODE 255: Performed by: COLON & RECTAL SURGERY

## 2017-09-06 PROCEDURE — 72197 MRI PELVIS W/O & W/DYE: CPT | Mod: TC

## 2017-09-06 PROCEDURE — 99214 OFFICE O/P EST MOD 30 MIN: CPT | Mod: S$PBB,,, | Performed by: COLON & RECTAL SURGERY

## 2017-09-06 PROCEDURE — 3008F BODY MASS INDEX DOCD: CPT | Mod: ,,, | Performed by: COLON & RECTAL SURGERY

## 2017-09-06 PROCEDURE — 72197 MRI PELVIS W/O & W/DYE: CPT | Mod: 26,,, | Performed by: RADIOLOGY

## 2017-09-06 PROCEDURE — 99212 OFFICE O/P EST SF 10 MIN: CPT | Mod: PBBFAC,25 | Performed by: COLON & RECTAL SURGERY

## 2017-09-06 PROCEDURE — 99999 PR PBB SHADOW E&M-EST. PATIENT-LVL II: CPT | Mod: PBBFAC,,, | Performed by: COLON & RECTAL SURGERY

## 2017-09-06 RX ORDER — AMOXICILLIN AND CLAVULANATE POTASSIUM 500; 125 MG/1; MG/1
1 TABLET, FILM COATED ORAL 2 TIMES DAILY
Qty: 28 TABLET | Refills: 0 | Status: SHIPPED | OUTPATIENT
Start: 2017-09-06 | End: 2017-09-20

## 2017-09-06 RX ORDER — GADOBUTROL 604.72 MG/ML
6 INJECTION INTRAVENOUS
Status: COMPLETED | OUTPATIENT
Start: 2017-09-06 | End: 2017-09-06

## 2017-09-06 RX ORDER — OXYCODONE AND ACETAMINOPHEN 5; 325 MG/1; MG/1
1 TABLET ORAL EVERY 6 HOURS PRN
Qty: 30 TABLET | Refills: 0 | Status: ON HOLD | OUTPATIENT
Start: 2017-09-06 | End: 2017-09-14

## 2017-09-06 RX ADMIN — GADOBUTROL 6 ML: 604.72 INJECTION INTRAVENOUS at 11:09

## 2017-09-06 NOTE — PROGRESS NOTES
Subjective:       Patient ID: Vinayak Castrejon is a 32 y.o. male.    Chief Complaint: Perirectal abscess  History of horseshoe abscess drained in 2016, presents today with recurrence that has been present for over 1 year. He has taken multiple courses of antibiotics over the past year, but the abscess continues to drain. He underwent a pelvic MRI today to assess the extent of the abscess.      Review of patient's allergies indicates:  No Known Allergies    Past Medical History:   Diagnosis Date    Atrial fibrillation     Cardiomyopathy     CHF (congestive heart failure)     Friedreich's ataxia     General anesthetics causing adverse effect in therapeutic use     Nystagmus     Scoliosis        Past Surgical History:   Procedure Laterality Date    acf      mutliple perirectal abscess      dec 2015    perianal abscess  02/2015    POSTERIOR CERVICAL LAMINECTOMY         Current Outpatient Prescriptions   Medication Sig Dispense Refill    alprazolam (XANAX) 0.25 MG tablet Take 1 tablet by mouth as needed.  0    apixaban (ELIQUIS) 5 mg Tab Take 5 mg by mouth 2 (two) times daily.      bisacodyl (DULCOLAX) 5 mg EC tablet Take 10 mg by mouth 3 (three) times daily as needed for Constipation.      carvedilol (COREG) 3.125 MG tablet Take 3.125 mg by mouth 2 (two) times daily.      famotidine (PEPCID) 20 MG tablet Take 20 mg by mouth 2 (two) times daily.      furosemide (LASIX) 20 MG tablet Take 2 tablets (40 mg total) by mouth 2 (two) times daily. 60 tablet 0    lidocaine HCL 2% (XYLOCAINE) 2 % jelly Apply topically 3 (three) times daily. Apply to the rectum prn. 30 mL 5    ondansetron (ZOFRAN-ODT) 4 MG TbDL Take 1 tablet (4 mg total) by mouth every 8 (eight) hours as needed (nausea and vomiting). 30 tablet 0    polyethylene glycol (MIRALAX) 17 gram PwPk Take by mouth.      quetiapine (SEROQUEL) 50 MG tablet Take 150 mg by mouth 2 (two) times daily.      senna-docusate 8.6-50 mg (PERICOLACE) 8.6-50 mg per  "tablet Take 1 tablet by mouth once daily.      digoxin (LANOXIN) 250 mcg tablet Take 1 tablet (0.25 mg total) by mouth once daily. 30 tablet 0     No current facility-administered medications for this visit.        Family History   Problem Relation Age of Onset    ADD / ADHD Mother     ADD / ADHD Sister     ADD / ADHD Sister        Social History     Social History    Marital status: Single     Spouse name: N/A    Number of children: N/A    Years of education: N/A     Social History Main Topics    Smoking status: Current Every Day Smoker     Packs/day: 0.50     Years: 13.00     Types: Cigarettes    Smokeless tobacco: None    Alcohol use No    Drug use:      Frequency: 3.0 times per week     Types: Marijuana    Sexual activity: No     Other Topics Concern    None     Social History Narrative    None       Review of Systems   Constitutional: Negative for chills and fever.   Eyes: Negative for discharge and redness.   Respiratory: Negative for shortness of breath.    Cardiovascular: Negative for chest pain.   Gastrointestinal: Positive for anal bleeding and rectal pain.   Skin: Positive for color change and wound.   Neurological: Positive for weakness.   Hematological: Negative for adenopathy.   Psychiatric/Behavioral: Positive for confusion.       Objective:     BP (!) 83/49   Pulse 97   Ht 5' 7.01" (1.702 m)     Physical Exam   Constitutional: He appears well-developed. No distress.   HENT:   Head: Normocephalic and atraumatic.   Eyes: EOM are normal. Pupils are equal, round, and reactive to light.   Neck: Normal range of motion. Neck supple.   Cardiovascular: Normal rate and regular rhythm.    Pulmonary/Chest: Effort normal. No respiratory distress.   Abdominal: Soft. He exhibits no distension. There is no tenderness. There is no guarding.   Neurological: He is alert.   Skin: Skin is warm and dry. He is not diaphoretic.   Nursing note and vitals reviewed.        Lab Results   Component Value Date    " WBC 11.93 08/30/2017    HGB 10.1 (L) 08/30/2017    HCT 33.4 (L) 08/30/2017    MCV 76 (L) 08/30/2017     (H) 08/30/2017     BMP  Lab Results   Component Value Date     08/30/2017    K 4.4 08/30/2017     08/30/2017    CO2 28 08/30/2017    BUN 10 08/30/2017    CREATININE 0.81 08/30/2017    CALCIUM 9.7 08/30/2017    ANIONGAP 12 08/30/2017    ESTGFRAFRICA >60.0 08/30/2017    EGFRNONAA >60.0 08/30/2017     CMP  Sodium   Date Value Ref Range Status   08/30/2017 144 136 - 145 mmol/L Final     Potassium   Date Value Ref Range Status   08/30/2017 4.4 3.5 - 5.1 mmol/L Final     Chloride   Date Value Ref Range Status   08/30/2017 104 95 - 110 mmol/L Final     CO2   Date Value Ref Range Status   08/30/2017 28 23 - 29 mmol/L Final     Glucose   Date Value Ref Range Status   08/30/2017 115 (H) 70 - 110 mg/dL Final     BUN, Bld   Date Value Ref Range Status   08/30/2017 10 2 - 20 mg/dL Final     Creatinine   Date Value Ref Range Status   08/30/2017 0.81 0.50 - 1.40 mg/dL Final     Calcium   Date Value Ref Range Status   08/30/2017 9.7 8.7 - 10.5 mg/dL Final     Total Protein   Date Value Ref Range Status   06/30/2016 8.2 6.0 - 8.4 g/dL Final     Albumin   Date Value Ref Range Status   06/30/2016 4.2 3.5 - 5.2 g/dL Final     Total Bilirubin   Date Value Ref Range Status   06/30/2016 0.4 0.1 - 1.0 mg/dL Final     Comment:     For infants and newborns, interpretation of results should be based  on gestational age, weight and in agreement with clinical  observations.  Premature Infant recommended reference ranges:  Up to 24 hours.............<8.0 mg/dL  Up to 48 hours............<12.0 mg/dL  3-5 days..................<15.0 mg/dL  6-29 days.................<15.0 mg/dL       Alkaline Phosphatase   Date Value Ref Range Status   06/30/2016 82 38 - 126 U/L Final     AST   Date Value Ref Range Status   06/30/2016 24 15 - 46 U/L Final     ALT   Date Value Ref Range Status   06/30/2016 25 10 - 44 U/L Final     Anion Gap    Date Value Ref Range Status   08/30/2017 12 8 - 16 mmol/L Final     eGFR if    Date Value Ref Range Status   08/30/2017 >60.0 >60 mL/min/1.73 m^2 Final     eGFR if non    Date Value Ref Range Status   08/30/2017 >60.0 >60 mL/min/1.73 m^2 Final     Comment:     Calculation used to obtain the estimated glomerular filtration  rate (eGFR) is the CKD-EPI equation. Since race is unknown   in our information system, the eGFR values for   -American and Non--American patients are given   for each creatinine result.       Radiology:  Pelvic MRI 9/6/17:  There is a large fistula originating from the right lateral aspect of the lower rectum, sequence 4 image 21 which extends cranially along the right levator musculature.  The fistula terminates within a presacral fluid collection measuring 5.9 cm in craniocaudal length.  Greatest axial dimension measures 4.1 x 1.6 cm.  Has abnormal signal within the sacrococcygeal segments starting in tear aspect of S3.  Subtle anterior cortical loss noted in the anterior coccyx.  There is edema and enhancement surrounding exiting sacral nerve roots as well as involving bilateral piriform musculature without intramuscular abscess.  Rectum proximal to the fistula demonstrates wall thickening and slight distention.    Assessment:       Perirectal abscess  Plan:       Plan for EUA with drainage of abscess and possible fistulotomy vs. [facility]on placement next Thursday, 9/14/17    Risks of the procedure were discussed in clinic, all questions were answered, and signed consent was obtained.    Referred for infectious disease consult for possible osteomyelitis of sacrum seen on MRI today.    Was given augmentin for 14 days and percocet for his pain.    I have personally obtained a history and performed a physical exam with and independent to my resident and discussed the findings and plan with the patient.  I agree with the above findings and plan with the  following exceptions:  None - I discussed the above plan with the patient and his mother.          HRicardo MD, FACS, FASCRS  Staff Surgeon  Dept of Colon and Rectal Surgery  [facility] [place], [place]

## 2017-09-11 DIAGNOSIS — K60.3 ANAL FISTULA: Primary | ICD-10-CM

## 2017-09-14 ENCOUNTER — HOSPITAL ENCOUNTER (OUTPATIENT)
Facility: HOSPITAL | Age: 32
Discharge: HOME | End: 2017-09-14
Attending: COLON & RECTAL SURGERY | Admitting: COLON & RECTAL SURGERY
Payer: MEDICAID

## 2017-09-14 ENCOUNTER — SURGERY (OUTPATIENT)
Age: 32
End: 2017-09-14

## 2017-09-14 ENCOUNTER — ANESTHESIA (OUTPATIENT)
Dept: SURGERY | Facility: HOSPITAL | Age: 32
End: 2017-09-14
Payer: MEDICAID

## 2017-09-14 ENCOUNTER — ANESTHESIA EVENT (OUTPATIENT)
Dept: SURGERY | Facility: HOSPITAL | Age: 32
End: 2017-09-14
Payer: MEDICAID

## 2017-09-14 VITALS
HEART RATE: 77 BPM | DIASTOLIC BLOOD PRESSURE: 86 MMHG | TEMPERATURE: 98 F | RESPIRATION RATE: 16 BRPM | SYSTOLIC BLOOD PRESSURE: 104 MMHG | OXYGEN SATURATION: 100 % | WEIGHT: 130 LBS | HEIGHT: 67 IN | BODY MASS INDEX: 20.4 KG/M2

## 2017-09-14 DIAGNOSIS — K60.3 ANAL FISTULA: ICD-10-CM

## 2017-09-14 DIAGNOSIS — I43 CARDIOMYOPATHY DUE TO SYSTEMIC DISEASE: Primary | ICD-10-CM

## 2017-09-14 PROCEDURE — 63600175 PHARM REV CODE 636 W HCPCS: Performed by: NURSE ANESTHETIST, CERTIFIED REGISTERED

## 2017-09-14 PROCEDURE — 25000003 PHARM REV CODE 250: Performed by: NURSE PRACTITIONER

## 2017-09-14 PROCEDURE — 37000009 HC ANESTHESIA EA ADD 15 MINS: Performed by: COLON & RECTAL SURGERY

## 2017-09-14 PROCEDURE — D9220A PRA ANESTHESIA: Mod: ANES,,, | Performed by: ANESTHESIOLOGY

## 2017-09-14 PROCEDURE — D9220A PRA ANESTHESIA: Mod: CRNA,,, | Performed by: NURSE ANESTHETIST, CERTIFIED REGISTERED

## 2017-09-14 PROCEDURE — 46040 I&D ISCHIORCT&/PERIRCT ABSC: CPT | Mod: ,,, | Performed by: COLON & RECTAL SURGERY

## 2017-09-14 PROCEDURE — 36000705 HC OR TIME LEV I EA ADD 15 MIN: Performed by: COLON & RECTAL SURGERY

## 2017-09-14 PROCEDURE — 25000003 PHARM REV CODE 250: Performed by: COLON & RECTAL SURGERY

## 2017-09-14 PROCEDURE — C9290 INJ, BUPIVACAINE LIPOSOME: HCPCS | Performed by: COLON & RECTAL SURGERY

## 2017-09-14 PROCEDURE — 71000015 HC POSTOP RECOV 1ST HR: Performed by: COLON & RECTAL SURGERY

## 2017-09-14 PROCEDURE — 63600175 PHARM REV CODE 636 W HCPCS: Performed by: COLON & RECTAL SURGERY

## 2017-09-14 PROCEDURE — 36000704 HC OR TIME LEV I 1ST 15 MIN: Performed by: COLON & RECTAL SURGERY

## 2017-09-14 PROCEDURE — 37000008 HC ANESTHESIA 1ST 15 MINUTES: Performed by: COLON & RECTAL SURGERY

## 2017-09-14 PROCEDURE — 25000003 PHARM REV CODE 250: Performed by: NURSE ANESTHETIST, CERTIFIED REGISTERED

## 2017-09-14 PROCEDURE — A4216 STERILE WATER/SALINE, 10 ML: HCPCS | Performed by: NURSE ANESTHETIST, CERTIFIED REGISTERED

## 2017-09-14 RX ORDER — SODIUM CHLORIDE 0.9 % (FLUSH) 0.9 %
3 SYRINGE (ML) INJECTION
Status: DISCONTINUED | OUTPATIENT
Start: 2017-09-14 | End: 2017-09-14 | Stop reason: HOSPADM

## 2017-09-14 RX ORDER — FENTANYL CITRATE 50 UG/ML
25 INJECTION, SOLUTION INTRAMUSCULAR; INTRAVENOUS EVERY 5 MIN PRN
Status: DISCONTINUED | OUTPATIENT
Start: 2017-09-14 | End: 2017-09-14 | Stop reason: HOSPADM

## 2017-09-14 RX ORDER — LIDOCAINE HYDROCHLORIDE 10 MG/ML
1 INJECTION, SOLUTION EPIDURAL; INFILTRATION; INTRACAUDAL; PERINEURAL ONCE
Status: DISCONTINUED | OUTPATIENT
Start: 2017-09-14 | End: 2017-09-14 | Stop reason: HOSPADM

## 2017-09-14 RX ORDER — PROPOFOL 10 MG/ML
VIAL (ML) INTRAVENOUS
Status: DISCONTINUED | OUTPATIENT
Start: 2017-09-14 | End: 2017-09-14

## 2017-09-14 RX ORDER — BUPIVACAINE HYDROCHLORIDE AND EPINEPHRINE 5; 5 MG/ML; UG/ML
INJECTION, SOLUTION EPIDURAL; INTRACAUDAL; PERINEURAL
Status: DISCONTINUED | OUTPATIENT
Start: 2017-09-14 | End: 2017-09-14 | Stop reason: HOSPADM

## 2017-09-14 RX ORDER — FENTANYL CITRATE 50 UG/ML
INJECTION, SOLUTION INTRAMUSCULAR; INTRAVENOUS
Status: DISCONTINUED | OUTPATIENT
Start: 2017-09-14 | End: 2017-09-14

## 2017-09-14 RX ORDER — DEXMEDETOMIDINE HYDROCHLORIDE 100 UG/ML
INJECTION, SOLUTION INTRAVENOUS
Status: DISCONTINUED | OUTPATIENT
Start: 2017-09-14 | End: 2017-09-14

## 2017-09-14 RX ORDER — DIPHENHYDRAMINE HYDROCHLORIDE 50 MG/ML
INJECTION INTRAMUSCULAR; INTRAVENOUS
Status: DISCONTINUED | OUTPATIENT
Start: 2017-09-14 | End: 2017-09-14

## 2017-09-14 RX ORDER — MIDAZOLAM HYDROCHLORIDE 1 MG/ML
INJECTION, SOLUTION INTRAMUSCULAR; INTRAVENOUS
Status: DISCONTINUED | OUTPATIENT
Start: 2017-09-14 | End: 2017-09-14

## 2017-09-14 RX ORDER — SODIUM CHLORIDE 9 MG/ML
INJECTION, SOLUTION INTRAVENOUS CONTINUOUS
Status: DISCONTINUED | OUTPATIENT
Start: 2017-09-14 | End: 2017-09-14 | Stop reason: HOSPADM

## 2017-09-14 RX ORDER — OXYCODONE AND ACETAMINOPHEN 5; 325 MG/1; MG/1
1 TABLET ORAL EVERY 6 HOURS PRN
Qty: 31 TABLET | Refills: 0 | Status: SHIPPED | OUTPATIENT
Start: 2017-09-14 | End: 2017-10-04 | Stop reason: ALTCHOICE

## 2017-09-14 RX ORDER — HYDROCODONE BITARTRATE AND ACETAMINOPHEN 5; 325 MG/1; MG/1
1 TABLET ORAL EVERY 4 HOURS PRN
Status: DISCONTINUED | OUTPATIENT
Start: 2017-09-14 | End: 2017-09-14 | Stop reason: HOSPADM

## 2017-09-14 RX ORDER — ETOMIDATE 2 MG/ML
INJECTION INTRAVENOUS
Status: DISCONTINUED | OUTPATIENT
Start: 2017-09-14 | End: 2017-09-14

## 2017-09-14 RX ADMIN — ETOMIDATE 6 MG: 2 INJECTION, SOLUTION INTRAVENOUS at 08:09

## 2017-09-14 RX ADMIN — DEXMEDETOMIDINE HYDROCHLORIDE 14.75 MCG: 100 INJECTION, SOLUTION, CONCENTRATE INTRAVENOUS at 08:09

## 2017-09-14 RX ADMIN — ETOMIDATE 4 MG: 2 INJECTION, SOLUTION INTRAVENOUS at 08:09

## 2017-09-14 RX ADMIN — ETOMIDATE 3 MG: 2 INJECTION, SOLUTION INTRAVENOUS at 08:09

## 2017-09-14 RX ADMIN — MIDAZOLAM HYDROCHLORIDE 0.5 MG: 1 INJECTION, SOLUTION INTRAMUSCULAR; INTRAVENOUS at 08:09

## 2017-09-14 RX ADMIN — ETOMIDATE 5 MG: 2 INJECTION, SOLUTION INTRAVENOUS at 08:09

## 2017-09-14 RX ADMIN — BUPIVACAINE HYDROCHLORIDE AND EPINEPHRINE 20 ML: 5; 5 INJECTION, SOLUTION EPIDURAL; INTRACAUDAL; PERINEURAL at 08:09

## 2017-09-14 RX ADMIN — PROPOFOL 30 MG: 10 INJECTION, EMULSION INTRAVENOUS at 08:09

## 2017-09-14 RX ADMIN — SODIUM CHLORIDE: 0.9 INJECTION, SOLUTION INTRAVENOUS at 07:09

## 2017-09-14 RX ADMIN — PROPOFOL 40 MG: 10 INJECTION, EMULSION INTRAVENOUS at 08:09

## 2017-09-14 RX ADMIN — MIDAZOLAM HYDROCHLORIDE 1 MG: 1 INJECTION, SOLUTION INTRAMUSCULAR; INTRAVENOUS at 08:09

## 2017-09-14 RX ADMIN — BUPIVACAINE 20 ML: 13.3 INJECTION, SUSPENSION, LIPOSOMAL INFILTRATION at 08:09

## 2017-09-14 RX ADMIN — PROPOFOL 20 MG: 10 INJECTION, EMULSION INTRAVENOUS at 08:09

## 2017-09-14 RX ADMIN — DIPHENHYDRAMINE HYDROCHLORIDE 12.5 MG: 50 INJECTION, SOLUTION INTRAMUSCULAR; INTRAVENOUS at 08:09

## 2017-09-14 RX ADMIN — PROPOFOL 50 MG: 10 INJECTION, EMULSION INTRAVENOUS at 08:09

## 2017-09-14 RX ADMIN — FENTANYL CITRATE 25 MCG: 50 INJECTION, SOLUTION INTRAMUSCULAR; INTRAVENOUS at 08:09

## 2017-09-14 RX ADMIN — DEXMEDETOMIDINE HYDROCHLORIDE 0.7 MCG/KG/HR: 100 INJECTION, SOLUTION, CONCENTRATE INTRAVENOUS at 08:09

## 2017-09-14 NOTE — ANESTHESIA PREPROCEDURE EVALUATION
09/14/2017  Vinayak Castrejon is a 32 y.o., male.  Pre-operative evaluation for Procedure(s) (LRB):  EXAM UNDER ANESTHESIA (N/A)  FISTULOTOMY-RECTAL (N/A)  PLACEMENT-SETON DRAIN (N/A)    Vinayak Castrejon is a 32 y.o. 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      Current Outpatient Prescriptions on File Prior to Encounter   Medication Sig Dispense Refill    amoxicillin-clavulanate 500-125mg (AUGMENTIN) 500-125 mg Tab Take 1 tablet (500 mg total) by mouth 2 (two) times daily. 28 tablet 0    bisacodyl (DULCOLAX) 5 mg EC tablet Take 10 mg by mouth 3 (three) times daily as needed for Constipation.      carvedilol (COREG) 3.125 MG tablet Take 3.125 mg by mouth 2 (two) times daily.      digoxin (LANOXIN) 250 mcg tablet Take 1 tablet (0.25 mg total) by mouth once daily. 30 tablet 0    famotidine (PEPCID) 20 MG tablet Take 20 mg by mouth 2 (two) times daily.      oxycodone-acetaminophen (PERCOCET) 5-325 mg per tablet Take 1 tablet by mouth every 6 (six) hours as needed for Pain. 30 tablet 0    polyethylene glycol (MIRALAX) 17 gram PwPk Take by mouth.      quetiapine (SEROQUEL) 50 MG tablet Take 150 mg by mouth 2 (two) times daily.      senna-docusate 8.6-50 mg (PERICOLACE) 8.6-50 mg per tablet Take 1 tablet by mouth once daily.      alprazolam (XANAX) 0.25 MG tablet Take 1 tablet by mouth as needed.  0    apixaban (ELIQUIS) 5 mg Tab Take 5 mg by mouth 2 (two) times daily.      furosemide (LASIX) 20 MG tablet Take 2 tablets (40 mg total) by mouth 2 (two) times daily. 60  tablet 0    lidocaine HCL 2% (XYLOCAINE) 2 % jelly Apply topically 3 (three) times daily. Apply to the rectum prn. 30 mL 5    ondansetron (ZOFRAN-ODT) 4 MG TbDL Take 1 tablet (4 mg total) by mouth every 8 (eight) hours as needed (nausea and vomiting). 30 tablet 0       Past Surgical History:   Procedure Laterality Date    acf      mutliple perirectal abscess      dec 2015    perianal abscess  02/2015    POSTERIOR CERVICAL LAMINECTOMY         Social History     Social History    Marital status: Single     Spouse name: N/A    Number of children: N/A    Years of education: N/A     Occupational History    Not on file.     Social History Main Topics    Smoking status: Current Every Day Smoker     Packs/day: 0.50     Years: 13.00     Types: Cigarettes    Smokeless tobacco: Not on file    Alcohol use No    Drug use:      Frequency: 3.0 times per week     Types: Marijuana    Sexual activity: No     Other Topics Concern    Not on file     Social History Narrative    No narrative on file         Vital Signs Range (Last 24H):  Temp:  [36.9 °C (98.4 °F)]   Pulse:  [86]   Resp:  [18]   BP: (98)/(61)   SpO2:  [98 %]         Anesthesia Evaluation    I have reviewed the Patient Summary Reports.     I have reviewed the Medications.     Review of Systems  Anesthesia Hx:  History of prior surgery of interest to airway management or planning:   Social:  Smoker    Cardiovascular:   Exercise tolerance: poor CHF CONCLUSIONS     1 - Severely depressed left ventricular systolic function (EF 10-15%).     2 - Left ventricular diastolic dysfunction.     3 - Severely depressed right ventricular systolic function .     4 - Moderate to severe mitral regurgitation.     5 - Moderate to severe tricuspid regurgitation.     6 - The estimated RV systolic pressure is 40 mmHg.     7 - Increased central venous pressure.    Musculoskeletal:   Friedrichs ataxia       Physical Exam   Airway/Jaw/Neck:  Airway Findings: Mouth Opening: Normal  Tongue: Normal  General Airway Assessment: Adult  Improves to I with phonation.  TM Distance: Normal, at least 6 cm      Dental:  Dental Findings: In tact        Mental Status:  Mental Status Findings:  Cooperative, Alert and Oriented         Anesthesia Plan  Type of Anesthesia, risks & benefits discussed:  Anesthesia Type:  MAC  Patient's Preference:   Intra-op Monitoring Plan: standard ASA monitors  Intra-op Monitoring Plan Comments:   Post Op Pain Control Plan: multimodal analgesia  Post Op Pain Control Plan Comments:   Induction:   IV  Beta Blocker:  Patient is on a Beta-Blocker and has received one dose within the past 24 hours (No further documentation required).       Informed Consent: Patient understands risks and agrees with Anesthesia plan.  Questions answered. Anesthesia consent signed with patient.  ASA Score: 4     Day of Surgery Review of History & Physical:    H&P update referred to the surgeon.         Ready For Surgery From Anesthesia Perspective.

## 2017-09-14 NOTE — ANESTHESIA POSTPROCEDURE EVALUATION
"Anesthesia Post Evaluation    Patient: Vinayak Castrejon    Procedure(s) Performed: Procedure(s) (LRB):  EXAM UNDER ANESTHESIA (N/A)  PLACEMENT-SETON DRAIN (N/A)  INCISION and drainage -ABCESS-PERIANAL- (N/A)    Final Anesthesia Type: general  Patient location during evaluation: PACU  Patient participation: Yes- Able to Participate  Level of consciousness: awake and alert  Post-procedure vital signs: reviewed and stable  Pain management: adequate  Airway patency: patent  PONV status at discharge: No PONV  Anesthetic complications: no      Cardiovascular status: stable  Respiratory status: unassisted and spontaneous ventilation  Hydration status: euvolemic  Follow-up not needed.        Visit Vitals  /86   Pulse 77   Temp 36.6 °C (97.8 °F)   Resp 16   Ht 5' 7" (1.702 m)   Wt 59 kg (130 lb)   SpO2 100%   BMI 20.36 kg/m²       Pain/[place] Score: Pain Assessment Performed: Yes (9/14/2017  8:56 AM)  Presence of Pain: denies (9/14/2017 10:25 AM)  [place] Score: 9 (9/14/2017 10:25 AM)      "

## 2017-09-14 NOTE — DISCHARGE INSTRUCTIONS
Anal Fistula  The anal canal is the end portion of the intestinal tract. It includes the rectum and anus. Sometimes, an abnormal passage forms from the anal canal to the skin near the anus. This is called an anal fistula. Anal fistulas can also form from the anal canal to other organs, such as the vagina or urinary tract.  An anal fistula most often occurs due to an anal abscess or infection. It can also occur with certain conditions, such as Crohns disease. Trauma to the anal canal and surgery can also lead to anal fistulas.  Symptoms of an anal fistula can include:  · Pain in or near the rectum  · Drainage, which may contain blood, pus, or both (the drainage may be constant or stop and start again)  · Bleeding from the rectum  · Urinary problems  If you have an anal abscess or infection along with a fistula, you may also notice redness, swelling, or soreness in or near the anus or rectum. You may have a fever as well.  If caused by Crohns disease, an anal fistula may respond to medicines such as antibiotics and immunosuppressants. This may lead to complete closure of the fistula. But once treatment stops, there is a high chance that the fistula may form again.  Anal fistulas often require surgery if other treatments dont correct the problem. The type of surgery depends on the type of fistula. More than one surgery may be required.  Please discuss all forms of treatment with your healthcare provider.  Home care  As you recover from treatment, make sure to take any prescribed medicines as directed. Do not take any over-the-counter medicines without first talking to your healthcare provider.  You may also be advised to:  · Soak in a warm bath 3 or 4 times a day.  · Wear a pad over your anal area as directed.  · Eat a diet high in fiber.  · Drink plenty of fluids.  · Use a stool softener or bulk laxative as needed.  · Return to your normal routine only after being cleared by your healthcare provider.  Follow-up  care  Follow up with your healthcare provider, or as advised.  When to seek medical advice  Call your healthcare provider right away if any of these occur:  · Fever of 100.4°F (38°C) or higher  · Hard or painful stools or trouble controlling your bowel movements  · Symptoms of anal fistula return  · Increased pain, redness, swelling, or drainage in or near the anus or rectum  · Pain in the belly that does not respond to treatment or that does not go away after a few hours  · Swelling in the belly that does not go away after a few hours  · Mucus, pus or blood in the stool (dark or bright red)  · Vomiting that wont stop  Call 911  Call 911 right away if any of these occur:  · Trouble breathing or swallowing  · Fainting  · Rapid heart rate  · Large amounts of blood in stool  Resources  The resources below can help you learn more about anal fistulas. They may also help you find support if you have conditions such as Crohns disease.  · National [place] of Diabetes and Digestive and Kidney Diseases (NIDDK), www.niddk.nih.gov  · Crohns and Colitis Foundation of [place], www.ccfa.org  Date Last Reviewed: 6/22/2015  © 8553-3486 [employer]. [address] [place] [address], [place] [address]. All rights reserved. This information is not intended as a substitute for professional medical care. Always follow your healthcare professional's instructions.

## 2017-09-14 NOTE — OP NOTE
DATE OF PROCEDURE:  09/14/2017    INDICATIONS:  The patient is a 32-year-old male with a history of a horseshoe   abscess.  The patient has had recurrent drainage following fistulectomy that was   performed in the spring of 2016.  I have recommended that he undergo exam under   anesthesia with possible drainage of the complex fistula using a non-cutting   seton.  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  I have counseled the patient and his mother   regarding the objective of today, which is provide drainage of any fluid   collections superficially, but that definitive surgery would be necessary for   his presacral cyst.    PREOPERATIVE DIAGNOSES:  History of horseshoe abscess, history of presacral   mass.    POSTOPERATIVE DIAGNOSES:  History of horseshoe abscess, history of presacral   mass.    PROCEDURES PERFORMED:  Drainage of complex fistula with insertion of non-cutting   seton.    SURGEON:  SUSHMA Mendoza M.D.    ASSISTANT SURGEON:  Richie Mejía M.D. (RES).    TYPE OF ANESTHESIA:  Monitored anesthesia care.    ESTIMATED BLOOD LOSS:  10 mL.    DRAINS:  Blue vessel loops were used as non-cutting seton extending from the   left posterolateral position in the ischiorectal fossa to the posterior midline.    There was noted to be a cavity, which included the deep postanal space.    FINDINGS:  There was noted to be evidence of scar essentially in a horseshoe   configuration in the perianal area extending posteriorly.  There was no mass or   fluctuance externally.  There was a palpable mass posteriorly into the patient's   right side with extrinsic protrusion into the rectum on the right lateral   position.  This was nonfluctuant.  There was no evidence of ad suppuration on   today's exam under anesthesia.  However, I elected to drain the left   ischiorectal fossa and the posterior midline prior to facilitate drainage of any   future infection prior to  definitive surgery of his presacral cystic mass.    TECHNIQUE IN DETAIL:  The patient was brought to the Operating Room, positively   identified and placed on the operating table in supine position with sequential   compression devices on his lower extremities.  The patient underwent a deep   sedation and was positioned in the dorsal lithotomy position and padded Yellofin   stirrups.  His perineum was prepared and draped in usual fashion.  A critical   timeout was performed.  Upon inspection of the perineum, the patient was noted   to have evidence of a well-healed scar without evidence of any deformities or   erythema.  There area no palpable external masses.  There were no fluctuant   areas.  Then, 0.25% Marcaine and Exparel were then infiltrated in the perianal   skin and around the anorectum to provide the analgesia.  Exam under anesthesia   was performed using a finder needle to see if there was any acute suppuration   deep.  There was not.  A digital examination revealed a fullness in the   posterior aspect of the rectum and there was noted to be a protrusion which was   extrinsic along the right lateral aspect of the distal rectum.  This was   nonfluctuant and it was more induration and firm than it was fluctuant.    In view of the absence of any acute suppuration, I made a decision to drain the   ischiorectal fossa on the left side as well as in the posterior midline in   anticipation of any future collections or acute infection.  Excision of ellipse   of skin was performed in the left posterolateral position overlying the   ischiorectal fossa as well as the posterior midline.  The space in the   ischiorectal fossa was explored and there was noted to be evidence of cavity   without suppuration.  It coursed to the posterior midline.  Posterior midline   wound was deepened through the anococcygeal ligament.  We placed vessel loops   through this tract and loosely tied it to itself with 2-0 silk ties to serve  as   a non-cutting seton.  A dry gauze dressing was applied to the wound and held in   place with mesh underwear.  No other procedures were performed at this time.    The patient was returned to the supine position and was returned to the Recovery   area in stable condition.      ATUL/VIANEY  dd: 09/14/2017 16:35:01 (CDT)  td: 09/14/2017 17:22:11 (CDT)  Doc ID   #[id number]  Job ID #[id number]    CC:

## 2017-09-14 NOTE — PLAN OF CARE
0923: Patient arrived from operative area via stretcher. Patient BP low, but within limits of preoperative level. No drainage noted from operative site. Telemetry monitoring in place. Monitoring on-going.     0935: Patient still somnolent. Attempted to be aroused; minimal response. VS stable. Patient safety maintained. Will continue to monitor.     0940: Patient aroused with gentle shaking and responded to voice. Patient made eye contact and nodded appropriately to questions. Continuing to monitor.

## 2017-09-14 NOTE — H&P
Subjective:       Patient ID: Vinayak Castrejon is a 32 y.o. male.     Chief Complaint: Perirectal abscess  History of horseshoe abscess drained in 2016, presents today with recurrence that has been present for over 1 year. He has taken multiple courses of antibiotics over the past year, but the abscess continues to drain. He underwent a pelvic MRI today to assess the extent of the abscess.     Review of patient's allergies indicates:  No Known Allergies          Past Medical History:   Diagnosis Date    Atrial fibrillation      Cardiomyopathy      CHF (congestive heart failure)      Friedreich's ataxia      General anesthetics causing adverse effect in therapeutic use      Nystagmus      Scoliosis                 Past Surgical History:   Procedure Laterality Date    acf        mutliple perirectal abscess         dec 2015    perianal abscess   02/2015    POSTERIOR CERVICAL LAMINECTOMY             Current Medications          Current Outpatient Prescriptions   Medication Sig Dispense Refill    alprazolam (XANAX) 0.25 MG tablet Take 1 tablet by mouth as needed.   0    apixaban (ELIQUIS) 5 mg Tab Take 5 mg by mouth 2 (two) times daily.        bisacodyl (DULCOLAX) 5 mg EC tablet Take 10 mg by mouth 3 (three) times daily as needed for Constipation.        carvedilol (COREG) 3.125 MG tablet Take 3.125 mg by mouth 2 (two) times daily.        famotidine (PEPCID) 20 MG tablet Take 20 mg by mouth 2 (two) times daily.        furosemide (LASIX) 20 MG tablet Take 2 tablets (40 mg total) by mouth 2 (two) times daily. 60 tablet 0    lidocaine HCL 2% (XYLOCAINE) 2 % jelly Apply topically 3 (three) times daily. Apply to the rectum prn. 30 mL 5    ondansetron (ZOFRAN-ODT) 4 MG TbDL Take 1 tablet (4 mg total) by mouth every 8 (eight) hours as needed (nausea and vomiting). 30 tablet 0    polyethylene glycol (MIRALAX) 17 gram PwPk Take by mouth.        quetiapine (SEROQUEL) 50 MG tablet Take 150 mg by mouth 2 (two) times  "daily.        senna-docusate 8.6-50 mg (PERICOLACE) 8.6-50 mg per tablet Take 1 tablet by mouth once daily.        digoxin (LANOXIN) 250 mcg tablet Take 1 tablet (0.25 mg total) by mouth once daily. 30 tablet 0      No current facility-administered medications for this visit.                   Family History   Problem Relation Age of Onset    ADD / ADHD Mother      ADD / ADHD Sister      ADD / ADHD Sister           Social History            Social History    Marital status: Single       Spouse name: N/A    Number of children: N/A    Years of education: N/A            Social History Main Topics    Smoking status: Current Every Day Smoker       Packs/day: 0.50       Years: 13.00       Types: Cigarettes    Smokeless tobacco: None    Alcohol use No    Drug use:         Frequency: 3.0 times per week       Types: Marijuana    Sexual activity: No           Other Topics Concern    None          Social History Narrative    None         Review of Systems   Constitutional: Negative for chills and fever.   Eyes: Negative for discharge and redness.   Respiratory: Negative for shortness of breath.    Cardiovascular: Negative for chest pain.   Gastrointestinal: Positive for anal bleeding and rectal pain.   Skin: Positive for color change and wound.   Neurological: Positive for weakness.   Hematological: Negative for adenopathy.   Psychiatric/Behavioral: Positive for confusion.       Objective:     BP (!) 83/49   Pulse 97   Ht 5' 7.01" (1.702 m)      Physical Exam   Constitutional: He appears well-developed. No distress.   HENT:   Head: Normocephalic and atraumatic.   Eyes: EOM are normal. Pupils are equal, round, and reactive to light.   Neck: Normal range of motion. Neck supple.   Cardiovascular: Normal rate and regular rhythm.    Pulmonary/Chest: Effort normal. No respiratory distress.   Abdominal: Soft. He exhibits no distension. There is no tenderness. There is no guarding.   Neurological: He is alert.   Skin: " Skin is warm and dry. He is not diaphoretic.   Nursing note and vitals reviewed.               Lab Results   Component Value Date     WBC 11.93 08/30/2017     HGB 10.1 (L) 08/30/2017     HCT 33.4 (L) 08/30/2017     MCV 76 (L) 08/30/2017      (H) 08/30/2017      BMP        Lab Results   Component Value Date      08/30/2017     K 4.4 08/30/2017      08/30/2017     CO2 28 08/30/2017     BUN 10 08/30/2017     CREATININE 0.81 08/30/2017     CALCIUM 9.7 08/30/2017     ANIONGAP 12 08/30/2017     ESTGFRAFRICA >60.0 08/30/2017     EGFRNONAA >60.0 08/30/2017      CMP        Sodium   Date Value Ref Range Status   08/30/2017 144 136 - 145 mmol/L Final            Potassium   Date Value Ref Range Status   08/30/2017 4.4 3.5 - 5.1 mmol/L Final            Chloride   Date Value Ref Range Status   08/30/2017 104 95 - 110 mmol/L Final            CO2   Date Value Ref Range Status   08/30/2017 28 23 - 29 mmol/L Final            Glucose   Date Value Ref Range Status   08/30/2017 115 (H) 70 - 110 mg/dL Final            BUN, Bld   Date Value Ref Range Status   08/30/2017 10 2 - 20 mg/dL Final            Creatinine   Date Value Ref Range Status   08/30/2017 0.81 0.50 - 1.40 mg/dL Final            Calcium   Date Value Ref Range Status   08/30/2017 9.7 8.7 - 10.5 mg/dL Final            Total Protein   Date Value Ref Range Status   06/30/2016 8.2 6.0 - 8.4 g/dL Final            Albumin   Date Value Ref Range Status   06/30/2016 4.2 3.5 - 5.2 g/dL Final      Total Bilirubin   Date Value Ref Range Status   06/30/2016 0.4 0.1 - 1.0 mg/dL Final       Comment:       For infants and newborns, interpretation of results should be based  on gestational age, weight and in agreement with clinical  observations.  Premature Infant recommended reference ranges:  Up to 24 hours.............<8.0 mg/dL  Up to 48 hours............<12.0 mg/dL  3-5 days..................<15.0 mg/dL  6-29 days.................<15.0 mg/dL            Alkaline  Phosphatase   Date Value Ref Range Status   06/30/2016 82 38 - 126 U/L Final            AST   Date Value Ref Range Status   06/30/2016 24 15 - 46 U/L Final            ALT   Date Value Ref Range Status   06/30/2016 25 10 - 44 U/L Final            Anion Gap   Date Value Ref Range Status   08/30/2017 12 8 - 16 mmol/L Final            eGFR if    Date Value Ref Range Status   08/30/2017 >60.0 >60 mL/min/1.73 m^2 Final              eGFR if non    Date Value Ref Range Status   08/30/2017 >60.0 >60 mL/min/1.73 m^2 Final       Comment:       Calculation used to obtain the estimated glomerular filtration  rate (eGFR) is the CKD-EPI equation. Since race is unknown   in our information system, the eGFR values for   -American and Non--American patients are given   for each creatinine result.      Radiology:  Pelvic MRI 9/6/17:  There is a large fistula originating from the right lateral aspect of the lower rectum, sequence 4 image 21 which extends cranially along the right levator musculature.  The fistula terminates within a presacral fluid collection measuring 5.9 cm in craniocaudal length.  Greatest axial dimension measures 4.1 x 1.6 cm.  Has abnormal signal within the sacrococcygeal segments starting in tear aspect of S3.  Subtle anterior cortical loss noted in the anterior coccyx.  There is edema and enhancement surrounding exiting sacral nerve roots as well as involving bilateral piriform musculature without intramuscular abscess.  Rectum proximal to the fistula demonstrates wall thickening and slight distention.     Assessment:       Perirectal abscess  Plan:       Plan for EUA with drainage of abscess and possible fistulotomy vs. Seton placement next Thursday, 9/14/17     Risks of the procedure were discussed in clinic, all questions were answered, and signed consent was obtained.    Patient Seen and Examined    No changes to Medical History since previous clinic note. No new  medications, no hospitalizations or illnesses.    The rationale, risks and benefits of the procedure where explained to the patient, all questions were answered, consent was signed and placed in the chart.       Richie Mejía MD  Surgery, PGY-5  [phone]

## 2017-09-14 NOTE — TRANSFER OF CARE
"Anesthesia Transfer of Care Note    Patient: Vinayak Castrejon    Procedure(s) Performed: Procedure(s) (LRB):  EXAM UNDER ANESTHESIA (N/A)  PLACEMENT-SETON DRAIN (N/A)  INCISION and drainage -ABCESS-PERIANAL- (N/A)    Patient location: [facility]    Anesthesia Type: general    Transport from OR: Transported from OR on 2-3 L/min O2 by NC with adequate spontaneous ventilation    Post pain: adequate analgesia    Post assessment: no apparent anesthetic complications and tolerated procedure well    Post vital signs: stable    Level of consciousness: sedated    Nausea/Vomiting: no nausea/vomiting    Complications: none    Transfer of care protocol was followed      Last vitals: 09/14/17 0903  Visit Vitals  BP 98/61 (BP Location: Right arm, Patient Position: Lying)   Pulse 86   Temp 36.9 °C (98.4 °F) (Oral)   Resp 18   Ht 5' 7" (1.702 m)   Wt 59 kg (130 lb)   SpO2 98%   BMI 20.36 kg/m²     "

## 2017-09-14 NOTE — BRIEF OP NOTE
[facility]  Brief Operative Note     SUMMARY     Surgery Date: 9/14/2017     Surgeon(s) and Role:     * SUSHMA Mendoza MD - Primary     * Richie Mejía MD - Resident - Assisting        Pre-op Diagnosis:  Anal fistula [K60.3]    Post-op Diagnosis:  Post-Op Diagnosis Codes:     * Anal fistula [K60.3]    Procedure(s) (LRB):  EXAM UNDER ANESTHESIA (N/A)  PLACEMENT-SETON DRAIN (N/A)  INCISION and drainage -ABCESS-PERIANAL- (N/A)    Anesthesia: General/MAC    Description of the findings of the procedure: extrarectal mass in the right lateral posterior position, prior incision and drainage, opened with seton placement, no active suppurative material      Estimated Blood Loss: 10 cc      Specimens:   Specimen (12h ago through future)    None          Discharge Note    SUMMARY     Admit Date: 9/14/2017    Discharge Date and Time:  09/14/2017 9:02 AM    Hospital Course (synopsis of major diagnoses, care, treatment, and services provided during the course of the hospital stay): Hospital course: this patient was admitted for an outpatient procedure which they tolerated well without any apparent untoward event       Final Diagnosis: Post-Op Diagnosis Codes:     * Anal fistula [K60.3]    Disposition: Home or Self Care    Follow Up/Patient Instructions:     Medications:  Reconciled Home Medications:   Current Discharge Medication List      CONTINUE these medications which have CHANGED    Details   oxycodone-acetaminophen (PERCOCET) 5-325 mg per tablet Take 1 tablet by mouth every 6 (six) hours as needed for Pain.  Qty: 31 tablet, Refills: 0         CONTINUE these medications which have NOT CHANGED    Details   amoxicillin-clavulanate 500-125mg (AUGMENTIN) 500-125 mg Tab Take 1 tablet (500 mg total) by mouth 2 (two) times daily.  Qty: 28 tablet, Refills: 0      bisacodyl (DULCOLAX) 5 mg EC tablet Take 10 mg by mouth 3 (three) times daily as needed for Constipation.      carvedilol (COREG) 3.125 MG tablet Take  3.125 mg by mouth 2 (two) times daily.      digoxin (LANOXIN) 250 mcg tablet Take 1 tablet (0.25 mg total) by mouth once daily.  Qty: 30 tablet, Refills: 0      famotidine (PEPCID) 20 MG tablet Take 20 mg by mouth 2 (two) times daily.      polyethylene glycol (MIRALAX) 17 gram PwPk Take by mouth.      quetiapine (SEROQUEL) 50 MG tablet Take 150 mg by mouth 2 (two) times daily.      senna-docusate 8.6-50 mg (PERICOLACE) 8.6-50 mg per tablet Take 1 tablet by mouth once daily.      alprazolam (XANAX) 0.25 MG tablet Take 1 tablet by mouth as needed.  Refills: 0      apixaban (ELIQUIS) 5 mg Tab Take 5 mg by mouth 2 (two) times daily.      furosemide (LASIX) 20 MG tablet Take 2 tablets (40 mg total) by mouth 2 (two) times daily.  Qty: 60 tablet, Refills: 0      lidocaine HCL 2% (XYLOCAINE) 2 % jelly Apply topically 3 (three) times daily. Apply to the rectum prn.  Qty: 30 mL, Refills: 5    Associated Diagnoses: Granuloma of rectum; Acute anal fissure      ondansetron (ZOFRAN-ODT) 4 MG TbDL Take 1 tablet (4 mg total) by mouth every 8 (eight) hours as needed (nausea and vomiting).  Qty: 30 tablet, Refills: 0             Discharge Procedure Orders  Diet general     Call MD for:  temperature >100.4     Call MD for:  persistent nausea and vomiting     Call MD for:  severe uncontrolled pain     Call MD for:  difficulty breathing, headache or visual disturbances     Call MD for:  redness, tenderness, or signs of infection (pain, swelling, redness, odor or green/yellow discharge around incision site)     No dressing needed   Order Comments: Daily sitz baths       Follow-up Information     H Ricardo Mendoza MD In 2 weeks.    Specialty:  Colon and Rectal Surgery  Contact information:  [address]  [facility] [address] [phone]

## 2017-09-22 ENCOUNTER — TELEPHONE (OUTPATIENT)
Dept: SURGERY | Facility: CLINIC | Age: 32
End: 2017-09-22

## 2017-09-22 NOTE — TELEPHONE ENCOUNTER
----- Message from Nola León sent at 9/22/2017 10:46 AM CDT -----  Contact: ebonie - ronald aparicio - [phone]  tim - is asking for post op appt - nothing available for me to book - please call ebonie aparicio - [phone]

## 2017-10-04 ENCOUNTER — OFFICE VISIT (OUTPATIENT)
Dept: SURGERY | Facility: CLINIC | Age: 32
End: 2017-10-04
Payer: MEDICAID

## 2017-10-04 VITALS
BODY MASS INDEX: 20.4 KG/M2 | WEIGHT: 130 LBS | HEIGHT: 67 IN | DIASTOLIC BLOOD PRESSURE: 58 MMHG | HEART RATE: 101 BPM | SYSTOLIC BLOOD PRESSURE: 98 MMHG

## 2017-10-04 DIAGNOSIS — R19.09 PRESACRAL MASS: ICD-10-CM

## 2017-10-04 DIAGNOSIS — M46.28: ICD-10-CM

## 2017-10-04 DIAGNOSIS — Z85.038 ENCOUNTER FOR FOLLOW-UP SURVEILLANCE OF COLON CANCER: Primary | ICD-10-CM

## 2017-10-04 DIAGNOSIS — Z08 ENCOUNTER FOR FOLLOW-UP SURVEILLANCE OF COLON CANCER: Primary | ICD-10-CM

## 2017-10-04 PROCEDURE — 99024 POSTOP FOLLOW-UP VISIT: CPT | Mod: ,,, | Performed by: COLON & RECTAL SURGERY

## 2017-10-04 PROCEDURE — 99213 OFFICE O/P EST LOW 20 MIN: CPT | Mod: PBBFAC | Performed by: COLON & RECTAL SURGERY

## 2017-10-04 PROCEDURE — 99999 PR PBB SHADOW E&M-EST. PATIENT-LVL III: CPT | Mod: PBBFAC,,, | Performed by: COLON & RECTAL SURGERY

## 2017-10-04 RX ORDER — OXYCODONE AND ACETAMINOPHEN 5; 325 MG/1; MG/1
1 TABLET ORAL EVERY 4 HOURS PRN
Qty: 50 TABLET | Refills: 0 | Status: ON HOLD | OUTPATIENT
Start: 2017-10-04 | End: 2018-01-01 | Stop reason: HOSPADM

## 2017-10-04 NOTE — PROGRESS NOTES
"Minimal d/c per mom.  No pain.  No fever.  Soaking BID    Exam   Appears well  BP (!) 98/58 (BP Location: Left arm, Patient Position: Sitting, BP Method: Large (Automatic))   Pulse 101   Ht 5' 7" (1.702 m)   Wt 59 kg (130 lb)   BMI 20.36 kg/m²     Rectal exam      MRI    Large rectal fistula terminating within the presacral abscess.  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  Associated edema and enhancement involving the bilateral piriformis muscles and exiting sacral nerve roots.    Circumferential rectal thickening proximal to the fascial origin accentuated by lack of distention.  Proctitis possible.    Postsurgical changes related to left aspect perianal fistula without abscess.      Assessment  History of complex fistula - no evidence of acute suppuration s/p draining setons  Presacral mass  Osteomyelitis.      Recommend  ID consult  Keep setons  Discussed excision of presacral mass - likely will need combined abdominal and posterior approach given extension to S-3.  Given underlying cardiac dysfunction (EF 10%) this would obviously be a challenging and potentially morbid operation.  Given his osteomyelitis there likely is no other option.    Will discuss at MDT    "

## 2017-10-06 ENCOUNTER — TELEPHONE (OUTPATIENT)
Dept: SURGERY | Facility: CLINIC | Age: 32
End: 2017-10-06

## 2017-10-06 NOTE — TELEPHONE ENCOUNTER
----- Message from Shania Montgomery NP sent at 10/6/2017  4:26 PM CDT -----  Contact: [facility]:[phone]  I called the pharmacy X 2. ICD 10 code is K60.3 for anal fistula.   ----- Message -----  From: Nola Pruitt LPN  Sent: 10/6/2017   4:14 PM  To: Shania Montgomery NP    I gave the pharmacy 565.1 (anal fistula) and it is not good. Do you have a code?  ----- Message -----  From: Juvenal Law  Sent: 10/6/2017   2:02 PM  To: Reji Silva Staff    .                              Prescription Refill Request  Name of medication and dose :oxycodone-acetaminophen (PERCOCET) 5-325 mg per tablet    Pharmacy:[facility] [address] [place] [id number] [facility]   [phone] (Phone)  [phone] (Fax)      Are you completely out of your medication Yes    Additional Information:Pharmacy called and states pt insurance needs a diagnosis code to fill the medication. Pt had medicaid.

## 2017-10-06 NOTE — TELEPHONE ENCOUNTER
----- Message from Juvenal Law sent at 10/6/2017  2:02 PM CDT -----  Contact: [facility]:[phone]  .                              Prescription Refill Request  Name of medication and dose :oxycodone-acetaminophen (PERCOCET) 5-325 mg per tablet    Pharmacy:[facility] [address] [place] [id number] [facility]   [phone] (Phone)  [phone] (Fax)      Are you completely out of your medication Yes    Additional Information:Pharmacy called and states pt insurance needs a diagnosis code to fill the medication. Pt had medicaid.

## 2017-10-17 ENCOUNTER — LAB VISIT (OUTPATIENT)
Dept: LAB | Facility: HOSPITAL | Age: 32
End: 2017-10-17
Attending: INTERNAL MEDICINE
Payer: MEDICAID

## 2017-10-17 ENCOUNTER — OFFICE VISIT (OUTPATIENT)
Dept: INFECTIOUS DISEASES | Facility: CLINIC | Age: 32
End: 2017-10-17
Payer: MEDICAID

## 2017-10-17 VITALS
HEART RATE: 94 BPM | BODY MASS INDEX: 20.41 KG/M2 | DIASTOLIC BLOOD PRESSURE: 71 MMHG | TEMPERATURE: 98 F | HEIGHT: 67 IN | WEIGHT: 130.06 LBS | SYSTOLIC BLOOD PRESSURE: 102 MMHG

## 2017-10-17 DIAGNOSIS — M86.9 OSTEOMYELITIS, UNSPECIFIED SITE, UNSPECIFIED TYPE: Primary | ICD-10-CM

## 2017-10-17 DIAGNOSIS — M86.9 OSTEOMYELITIS, UNSPECIFIED SITE, UNSPECIFIED TYPE: ICD-10-CM

## 2017-10-17 LAB
BASOPHILS # BLD AUTO: 0.07 K/UL
BASOPHILS NFR BLD: 0.7 %
CRP SERPL-MCNC: 27.1 MG/L
DIFFERENTIAL METHOD: ABNORMAL
EOSINOPHIL # BLD AUTO: 0.3 K/UL
EOSINOPHIL NFR BLD: 3.3 %
ERYTHROCYTE [DISTWIDTH] IN BLOOD BY AUTOMATED COUNT: 15.8 %
ERYTHROCYTE [SEDIMENTATION RATE] IN BLOOD BY WESTERGREN METHOD: 54 MM/HR
HCT VFR BLD AUTO: 27 %
HGB BLD-MCNC: 7.8 G/DL
IMM GRANULOCYTES # BLD AUTO: 0.03 K/UL
IMM GRANULOCYTES NFR BLD AUTO: 0.3 %
LYMPHOCYTES # BLD AUTO: 1.2 K/UL
LYMPHOCYTES NFR BLD: 11.5 %
MCH RBC QN AUTO: 20.6 PG
MCHC RBC AUTO-ENTMCNC: 28.9 G/DL
MCV RBC AUTO: 71 FL
MONOCYTES # BLD AUTO: 0.7 K/UL
MONOCYTES NFR BLD: 6.4 %
NEUTROPHILS # BLD AUTO: 8.1 K/UL
NEUTROPHILS NFR BLD: 77.8 %
NRBC BLD-RTO: 0 /100 WBC
PLATELET # BLD AUTO: 506 K/UL
PMV BLD AUTO: 10.9 FL
RBC # BLD AUTO: 3.79 M/UL
WBC # BLD AUTO: 10.42 K/UL

## 2017-10-17 PROCEDURE — 86140 C-REACTIVE PROTEIN: CPT

## 2017-10-17 PROCEDURE — 99204 OFFICE O/P NEW MOD 45 MIN: CPT | Mod: S$PBB,,, | Performed by: INTERNAL MEDICINE

## 2017-10-17 PROCEDURE — 85651 RBC SED RATE NONAUTOMATED: CPT

## 2017-10-17 PROCEDURE — 36415 COLL VENOUS BLD VENIPUNCTURE: CPT

## 2017-10-17 PROCEDURE — 85025 COMPLETE CBC W/AUTO DIFF WBC: CPT

## 2017-10-17 PROCEDURE — 99213 OFFICE O/P EST LOW 20 MIN: CPT | Mod: PBBFAC | Performed by: INTERNAL MEDICINE

## 2017-10-17 PROCEDURE — 99999 PR PBB SHADOW E&M-EST. PATIENT-LVL III: CPT | Mod: PBBFAC,,, | Performed by: INTERNAL MEDICINE

## 2017-10-17 RX ORDER — PANTOPRAZOLE SODIUM 40 MG/1
40 TABLET, DELAYED RELEASE ORAL DAILY
COMMUNITY

## 2017-10-17 NOTE — PROGRESS NOTES
"Subjective:      Patient ID: Vinayak Castrejon is a 32 y.o. male.    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 The patient has a history of horseshoe abscess drained in 2016, managed by CRS for recurrence that has been present for over 1 year. He has taken multiple courses of antibiotics over the past year, but the abscess continues to drain. MRI performed demonstrated a "large rectal fistula terminating within the presacral abscess.  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  Associated edema and enhancement involving the bilateral piriformis muscles and exiting sacral nerve roots."    The patient denies any fever or chills. No recent culture information available.    Review of Systems   Constitution: Negative for chills and fever.   Musculoskeletal: Positive for myalgias.   All other systems reviewed and are negative.    Objective:   Physical Exam   Constitutional: He is oriented to person, place, and time. He appears well-developed and well-nourished. No distress.   Wheelchair bound   HENT:   Head: Normocephalic and atraumatic.   Eyes: EOM are normal. Pupils are equal, round, and reactive to light.   Neck: Normal range of motion.   Cardiovascular: Normal rate and regular rhythm.    Pulmonary/Chest: Effort normal and breath sounds normal.   Abdominal: Soft. Bowel sounds are normal.   Musculoskeletal: Normal range of motion. He exhibits no edema.   Neurological: He is alert and oriented to person, place, and time.   Skin: He is not diaphoretic.   Psychiatric: He has a normal mood and affect. His behavior is normal. Thought content normal.   Nursing note and vitals reviewed.    Assessment/Plan:       1. Osteomyelitis, unspecified site, unspecified type        Fluid collection noted on MR imaging. If abscess, recommend drainage, if at all possible. Otherwise, cure is unlikely. " Will order IR biopsy of S3 for path and culture in the meantime.  Baseline labs today. Discussed with Dr. Mendoza.

## 2017-10-17 NOTE — LETTER
October 17, 2017      SUSHMA Mendoza MD  [address]  [facility] [address]           [facility]  [address]  [facility] [address]  Phone: [phone]  Fax: [phone]          Patient: Vinayak Castrejon   MR Number: [id number]   YOB: 1985   Date of Visit: 10/17/2017       Dear Dr. SUSHMA Mendoza:    Thank you for referring Vinayak Castrejon to me for evaluation. Attached you will find relevant portions of my assessment and plan of care.    If you have questions, please do not hesitate to call me. I look forward to following Vinayak Castrejon along with you.    Sincerely,    Jared Gonzalez MD    Enclosure  CC:  No Recipients    If you would like to receive this communication electronically, please contact [email or web address] or [phone] to request more information on [employer] access.    For providers and/or their staff who would like to refer a patient to [facility], please contact us through our one-stop-shop provider referral line, [facility], at [phone].    If you feel you have received this communication in error or would no longer like to receive these types of communications, please e-mail [email or web address]

## 2017-10-30 DIAGNOSIS — M86.9 OSTEOMYELITIS, UNSPECIFIED SITE, UNSPECIFIED TYPE: Primary | ICD-10-CM

## 2017-11-02 DIAGNOSIS — M86.9 OSTEOMYELITIS, UNSPECIFIED SITE, UNSPECIFIED TYPE: Primary | ICD-10-CM

## 2017-11-03 ENCOUNTER — HOSPITAL ENCOUNTER (OUTPATIENT)
Facility: HOSPITAL | Age: 32
Discharge: HOME | End: 2017-11-03
Attending: INTERNAL MEDICINE | Admitting: INTERNAL MEDICINE
Payer: MEDICAID

## 2017-11-03 VITALS
HEIGHT: 67 IN | RESPIRATION RATE: 15 BRPM | HEART RATE: 86 BPM | DIASTOLIC BLOOD PRESSURE: 62 MMHG | TEMPERATURE: 98 F | SYSTOLIC BLOOD PRESSURE: 95 MMHG | WEIGHT: 125 LBS | OXYGEN SATURATION: 100 % | BODY MASS INDEX: 19.62 KG/M2

## 2017-11-03 DIAGNOSIS — M86.9 OSTEOMYELITIS, UNSPECIFIED SITE, UNSPECIFIED TYPE: ICD-10-CM

## 2017-11-03 LAB
GRAM STN SPEC: NORMAL
GRAM STN SPEC: NORMAL
INR PPP: 1.1
PROTHROMBIN TIME: 11.5 SEC

## 2017-11-03 PROCEDURE — 63600175 PHARM REV CODE 636 W HCPCS: Performed by: RADIOLOGY

## 2017-11-03 PROCEDURE — 88311 DECALCIFY TISSUE: CPT | Mod: 26,,, | Performed by: PATHOLOGY

## 2017-11-03 PROCEDURE — 87116 MYCOBACTERIA CULTURE: CPT

## 2017-11-03 PROCEDURE — 87075 CULTR BACTERIA EXCEPT BLOOD: CPT

## 2017-11-03 PROCEDURE — 88307 TISSUE EXAM BY PATHOLOGIST: CPT | Mod: 26,,, | Performed by: PATHOLOGY

## 2017-11-03 PROCEDURE — 88307 TISSUE EXAM BY PATHOLOGIST: CPT | Performed by: PATHOLOGY

## 2017-11-03 PROCEDURE — 85610 PROTHROMBIN TIME: CPT

## 2017-11-03 PROCEDURE — 87070 CULTURE OTHR SPECIMN AEROBIC: CPT | Mod: 59

## 2017-11-03 PROCEDURE — 87015 SPECIMEN INFECT AGNT CONCNTJ: CPT

## 2017-11-03 PROCEDURE — 87102 FUNGUS ISOLATION CULTURE: CPT

## 2017-11-03 PROCEDURE — 87205 SMEAR GRAM STAIN: CPT

## 2017-11-03 RX ORDER — FENTANYL CITRATE 50 UG/ML
50 INJECTION, SOLUTION INTRAMUSCULAR; INTRAVENOUS
Status: DISCONTINUED | OUTPATIENT
Start: 2017-11-03 | End: 2017-11-03 | Stop reason: HOSPADM

## 2017-11-03 RX ORDER — MIDAZOLAM HYDROCHLORIDE 1 MG/ML
INJECTION INTRAMUSCULAR; INTRAVENOUS CODE/TRAUMA/SEDATION MEDICATION
Status: COMPLETED | OUTPATIENT
Start: 2017-11-03 | End: 2017-11-03

## 2017-11-03 RX ORDER — SODIUM CHLORIDE 9 MG/ML
500 INJECTION, SOLUTION INTRAVENOUS ONCE
Status: DISCONTINUED | OUTPATIENT
Start: 2017-11-03 | End: 2017-11-03 | Stop reason: HOSPADM

## 2017-11-03 RX ORDER — MIDAZOLAM HYDROCHLORIDE 1 MG/ML
1 INJECTION INTRAMUSCULAR; INTRAVENOUS
Status: DISCONTINUED | OUTPATIENT
Start: 2017-11-03 | End: 2017-11-03 | Stop reason: HOSPADM

## 2017-11-03 RX ADMIN — MIDAZOLAM HYDROCHLORIDE 1 MG: 1 INJECTION, SOLUTION INTRAMUSCULAR; INTRAVENOUS at 11:11

## 2017-11-03 RX ADMIN — MIDAZOLAM HYDROCHLORIDE 0.5 MG: 1 INJECTION, SOLUTION INTRAMUSCULAR; INTRAVENOUS at 12:11

## 2017-11-03 RX ADMIN — MIDAZOLAM HYDROCHLORIDE 0.5 MG: 1 INJECTION, SOLUTION INTRAMUSCULAR; INTRAVENOUS at 11:11

## 2017-11-03 NOTE — DISCHARGE INSTRUCTIONS
Rest today. Avoid lifting or straining. Do not drive for 24 hours. Gradually resume your normal activities.    Gradually resume your normal diet.    You may shower or bathe in 24 hours.    Call the doctor if:    -There is bleeding/swelling at the biopsy site  -You feel weak or lightheaded  -Worsening pain  -Shortness of breath or chest pain    If you have any problems or questions our phone numbers are:    [facility]-(normal working hours)- [phone]    After hours-ask for the Radiology Resident on call at [phone]

## 2017-11-03 NOTE — H&P
Radiology History & Physical      SUBJECTIVE:     Chief Complaint: S3 osseus abnormality concerning for osteomyelitis     History of Present Illness:  Vinayak Castrejon is a 32 y.o. male who presents for S3 bone biopsy. Patient's mother consented for the procedure.     Past Medical History:   Diagnosis Date    Atrial fibrillation     Cardiomyopathy     CHF (congestive heart failure)     Friedreich's ataxia     General anesthetics causing adverse effect in therapeutic use     Nystagmus     Scoliosis      Past Surgical History:   Procedure Laterality Date    acf      mutliple perirectal abscess      dec 2015    perianal abscess  02/2015    POSTERIOR CERVICAL LAMINECTOMY         Home Meds:   Prior to Admission medications    Medication Sig Start Date End Date Taking? Authorizing Provider   alprazolam (XANAX) 0.25 MG tablet Take 1 tablet by mouth as needed. 7/7/16  Yes Historical Provider, MD   carvedilol (COREG) 3.125 MG tablet Take 3.125 mg by mouth 2 (two) times daily.   Yes Historical Provider, MD   digoxin (LANOXIN) 250 mcg tablet Take 1 tablet (0.25 mg total) by mouth once daily. 7/2/16  Yes Pallavi Sunkara, MD   famotidine (PEPCID) 20 MG tablet Take 20 mg by mouth 2 (two) times daily.   Yes Historical Provider, MD   furosemide (LASIX) 20 MG tablet Take 2 tablets (40 mg total) by mouth 2 (two) times daily. 7/2/16  Yes Pallavi Sunkara, MD   ondansetron (ZOFRAN-ODT) 4 MG TbDL Take 1 tablet (4 mg total) by mouth every 8 (eight) hours as needed (nausea and vomiting). 7/2/16  Yes Pallavi Sunkara, MD   oxycodone-acetaminophen (PERCOCET) 5-325 mg per tablet Take 1 tablet by mouth every 4 (four) hours as needed for Pain. 10/4/17  Yes SUSHMA Mendoza MD   polyethylene glycol (MIRALAX) 17 gram PwPk Take by mouth.   Yes Historical Provider, MD   quetiapine (SEROQUEL) 50 MG tablet Take 150 mg by mouth 2 (two) times daily.   Yes Historical Provider, MD   senna-docusate 8.6-50 mg (PERICOLACE) 8.6-50 mg per tablet Take 1  tablet by mouth once daily.   Yes Historical Provider, MD   apixaban (ELIQUIS) 5 mg Tab Take 5 mg by mouth 2 (two) times daily.    Historical Provider, MD   bisacodyl (DULCOLAX) 5 mg EC tablet Take 10 mg by mouth 3 (three) times daily as needed for Constipation.    Historical Provider, MD   lidocaine HCL 2% (XYLOCAINE) 2 % jelly Apply topically 3 (three) times daily. Apply to the rectum prn. 3/23/17   Nikky Keller PA-C   pantoprazole (PROTONIX) 40 MG tablet Take 40 mg by mouth once daily.    Historical Provider, MD     Anticoagulants/Antiplatelets: Elliquis     Allergies: Review of patient's allergies indicates:  No Known Allergies  Sedation History:  no adverse reactions    Review of Systems:   Hematological: no known coagulopathies  Respiratory: no shortness of breath  Cardiovascular: no chest pain  Gastrointestinal: no abdominal pain  Genito-Urinary: no dysuria  Musculoskeletal: weakness    Neurological: slow mentation          OBJECTIVE:     Vital Signs (Most Recent)  Temp: 98.3 °F (36.8 °C) (11/03/17 1000)  Pulse: 86 (11/03/17 1000)  Resp: 19 (11/03/17 1000)  BP: (!) 86/50 (11/03/17 1001)  SpO2: 99 % (11/03/17 1000)    Physical Exam:  ASA: 3  Mallampati: 2    General: no acute distress  Mental Status: alert, oriented to person, place and time  HEENT: normocephalic, atraumatic  Chest: unlabored breathing  Heart: regular heart rate  Abdomen: nondistended  Extremity: moves all extremities    Laboratory  No results found for: INR    Lab Results   Component Value Date    WBC 10.42 10/17/2017    HGB 7.8 (L) 10/17/2017    HCT 27.0 (L) 10/17/2017    MCV 71 (L) 10/17/2017     (H) 10/17/2017      Lab Results   Component Value Date     (H) 08/30/2017     08/30/2017    K 4.4 08/30/2017     08/30/2017    CO2 28 08/30/2017    BUN 10 08/30/2017    CREATININE 0.81 08/30/2017    CALCIUM 9.7 08/30/2017    MG 1.9 07/02/2016    ALT 25 06/30/2016    AST 24 06/30/2016    ALBUMIN 4.2 06/30/2016     BILITOT 0.4 06/30/2016       ASSESSMENT/PLAN:     Sedation Plan: moderate   Patient will undergo CT guided S3 bone biopsy.    Anoop Juarez MD  Department of Radiology   PGY II  [phone]

## 2017-11-03 NOTE — DISCHARGE SUMMARY
Radiology Discharge Summary      Hospital Course: No complications    Admit Date: 11/3/2017  Discharge Date: 11/03/2017     Instructions Given to Patient: Yes  Diet: regular diet and Resume prior diet  Activity: activity as tolerated and no driving for today    Description of Condition on Discharge: Good  Vital Signs (Most Recent): Temp: 98.3 °F (36.8 °C) (11/03/17 1000)  Pulse: 94 (11/03/17 1200)  Resp: 14 (11/03/17 1200)  BP: (!) 83/51 (11/03/17 1200)  SpO2: 96 % (11/03/17 1200)    Discharge Disposition: Home    Discharge Diagnosis: S/P abscess drainage and sacral biopsy     Follow-up: Infectious Disease clinic.    Gilberto Bunn MD  Attending Radiologist  Interventional Neuroradiology  Pager: [phone]

## 2017-11-03 NOTE — PROGRESS NOTES
Pt and mother verbalized understanding of discharge AVS instructions.VSS. No complaints at this time. IV Dc'd. Pt left via wheelchair to garage to travel home with family.

## 2017-11-03 NOTE — PROGRESS NOTES
Pt arrived to [facility], bay 5. Report received from EVANS Tinsley. Dressing to back dry and intact. Family at bedside.

## 2017-11-03 NOTE — PROCEDURES
Radiology Post-Procedure Note    Pre Op Diagnosis: presacral abscess and sacral osteomyelitis  Post Op Diagnosis: Same    Procedure: CT guided drainage of presacral abscess and sacral biopsy     Procedure performed by: Gilberto Bunn MD    Written Informed Consent Obtained: Yes  Specimen Removed: YES core bx of S3 sacrum, 11g needle  Also drainage of presacral abscess using 8F drain returning 10cc bloody pus.  Estimated Blood Loss: Minimal    Patient tolerated procedure well.    Gilberto Bunn MD  Attending Radiologist  Interventional Neuroradiology  Pager: [phone]

## 2017-11-06 LAB
BACTERIA SPEC AEROBE CULT: NO GROWTH
BACTERIA SPEC AEROBE CULT: NO GROWTH

## 2017-11-07 ENCOUNTER — OFFICE VISIT (OUTPATIENT)
Dept: SURGERY | Facility: CLINIC | Age: 32
End: 2017-11-07
Payer: MEDICAID

## 2017-11-07 VITALS
DIASTOLIC BLOOD PRESSURE: 72 MMHG | SYSTOLIC BLOOD PRESSURE: 100 MMHG | WEIGHT: 125 LBS | HEIGHT: 67 IN | BODY MASS INDEX: 19.62 KG/M2 | HEART RATE: 95 BPM

## 2017-11-07 DIAGNOSIS — R19.09 PRESACRAL MASS: Primary | ICD-10-CM

## 2017-11-07 PROCEDURE — 99213 OFFICE O/P EST LOW 20 MIN: CPT | Mod: PBBFAC | Performed by: COLON & RECTAL SURGERY

## 2017-11-07 PROCEDURE — 99213 OFFICE O/P EST LOW 20 MIN: CPT | Mod: S$PBB,24,, | Performed by: COLON & RECTAL SURGERY

## 2017-11-07 PROCEDURE — 99999 PR PBB SHADOW E&M-EST. PATIENT-LVL III: CPT | Mod: PBBFAC,,, | Performed by: COLON & RECTAL SURGERY

## 2017-11-07 NOTE — PROGRESS NOTES
CRS Office Visit    SUBJECTIVE:     Chief Complaint: f/u complex cyst     History of Present Illness:  Patient is a 32 y.o. male patient with Alma's ataxia, cardiomyopathy, atrial fibrillation on Eliquis and complex presacral fluid collection who presents for follow up.     Saw ID 3 weeks ago and since has undergone pigtail drainage of the cyst and IR biopsy of S3. So far from the cyst - there is no growth of bacteria (i.e. Fluid was sterile). Bone biopsy is still pending. He has not had any fevers or chills. Complains of pain since the bone biopsy. No longer having shooting pains down the back of his legs. The drain since being placed has only had 55mL (since Friday).     He also saw his cardiologist and repeat echo (10/27/2017) showed:  - EF of 10-15%  - Hypokinesis of inferior wall  - Trace mitral regurgitation    Review of patient's allergies indicates:  No Known Allergies    Past Medical History:   Diagnosis Date    Atrial fibrillation     Cardiomyopathy     CHF (congestive heart failure)     Friedreich's ataxia     General anesthetics causing adverse effect in therapeutic use     Nystagmus     Scoliosis      Past Surgical History:   Procedure Laterality Date    acf      mutliple perirectal abscess      dec 2015    perianal abscess  02/2015    POSTERIOR CERVICAL LAMINECTOMY       Family History   Problem Relation Age of Onset    ADD / ADHD Mother     ADD / ADHD Sister     ADD / ADHD Sister      Social History   Substance Use Topics    Smoking status: Current Every Day Smoker     Packs/day: 0.50     Years: 13.00     Types: Cigarettes    Smokeless tobacco: Current User    Alcohol use No        OBJECTIVE:     Vital Signs (Most Recent)  Pulse: 95 (11/07/17 1539)  BP: 100/72 (11/07/17 1539)    Physical Exam:  General: White male in NAD sitting in his wheelchair in clinic  Anorectal: Seton in place without drainage of purulent material. No abscess. Picture in media tab. Pigtail drain with  minimal drainage - irrigated and aspirated and no fluid returned, therefore drain removed.     ASSESSMENT/PLAN:     Complex fistula with presacral cyst and absence of infection of fluid. Awaiting bone biopsies given concern for osteomyelitis on MRI.     Discussed with patient and his mom the high morbidity which would be associated with a repeat operation for this cyst without a guarantee it wouldn't recur. Given this, his poor cardiac function, and the absence of infection of the fluid drained and absence of infection after draining seton placement, will hold on proceeding with surgical intervention at this time. We discussed that if the bone biopsies return negative as well, this is definitely the safest route.     - F/U bone biopsies  - Return to clinic in 6 weeks        Tiffany Wong MD  Colon and Rectal Surgery Fellow    I have personally obtained a history and performed a physical exam with and independent to my resident and discussed the findings and plan with the patient.  I agree with the above findings and plan with the following exceptions:  None    I informed pt and mother of the discussion at MDT conference - observation and repeat MRI and avoid intervention given the risk of major surgery and anesthetic    HRicardo MD, FACS, FASCRS  Staff Surgeon  Dept of Colon and Rectal Surgery  [facility] [place] [facility] [place]

## 2017-11-10 LAB — BACTERIA SPEC ANAEROBE CULT: NORMAL

## 2017-11-13 LAB — BACTERIA SPEC ANAEROBE CULT: NORMAL

## 2017-11-28 ENCOUNTER — TELEPHONE (OUTPATIENT)
Dept: INFECTIOUS DISEASES | Facility: HOSPITAL | Age: 32
End: 2017-11-28

## 2017-11-28 NOTE — TELEPHONE ENCOUNTER
----- Message from Lenore Hebert MA sent at 11/28/2017 12:10 PM CST -----  Contact: Caregiver :   Mariah   / MOM  tel;   [phone]      ----- Message -----  From: Jolly Artis  Sent: 11/28/2017  10:17 AM  To: JULIOCESAR Brown Dr[address]s pt./  Caller says she is checking on bone biopsy results from Nov. 3rd.    Pls call.

## 2017-12-06 LAB — FUNGUS SPEC CULT: NORMAL

## 2017-12-20 NOTE — PROGRESS NOTES
CRS Office Visit    SUBJECTIVE:     Chief Complaint: f/u complex cyst     History of Present Illness:  Patient is a 32 y.o. male patient with Alma's ataxia, cardiomyopathy, atrial fibrillation on Eliquis and complex presacral fluid collection who presents for follow up.     No growth of bacteria from cyst, and bone biopsy results were negative for infection and malignancy. He has not had any fevers or chills. He is complaining of pain on the opposite side of where the seton drain is in place, but it seems to be positional. Minimal drainage from seton.    Review of patient's allergies indicates:  No Known Allergies    Past Medical History:   Diagnosis Date    Atrial fibrillation     Cardiomyopathy     CHF (congestive heart failure)     Friedreich's ataxia     General anesthetics causing adverse effect in therapeutic use     Nystagmus     Scoliosis      Past Surgical History:   Procedure Laterality Date    acf      mutliple perirectal abscess      dec 2015    perianal abscess  02/2015    POSTERIOR CERVICAL LAMINECTOMY       Family History   Problem Relation Age of Onset    ADD / ADHD Mother     ADD / ADHD Sister     ADD / ADHD Sister      Social History   Substance Use Topics    Smoking status: Current Every Day Smoker     Packs/day: 0.50     Years: 13.00     Types: Cigarettes    Smokeless tobacco: Current User    Alcohol use No        OBJECTIVE:     Vital Signs (Most Recent)  Pulse: 97 (12/20/17 1549)  BP: (!) 89/64 (12/20/17 1549)    Physical Exam:  General: White male in NAD sitting in his wheelchair in clinic  Anorectal: Seton in place without drainage of purulent material. No abscess. No fluctuance or tenderness    Seton drain removed in clinic    Echo (10/27/2017) showed:  - EF of 10-15%  - Hypokinesis of inferior wall  - Trace mitral regurgitation    SPECIMEN  1) Sacral bone biopsy.  FINAL PATHOLOGIC DIAGNOSIS  Sacral bone, biopsy:  - Benign bone.  - Negative for infection or  malignancy.  This case was reviewed by Dr. QUE Cook, who concurs with the above interpretation.  Diagnosed by: Soledad Ly M.D.  (Electronically Signed: 2017-11-13 16:39:02)      ASSESSMENT/PLAN:     Complex fistula with presacral cyst and absence of infection of fluid. Bone biopsy negative for infection  NO signs of perianal sepsis    Discussed with mother and pt.      - continue non-operative management   - Return to clinic in 3 months    Shorty Russell MD  Colorectal Surgery, PGY-1  [facility]    I have personally obtained a history and performed a physical exam with and independent to my resident and discussed the findings and plan with the patient.  I agree with the above findings and plan with the following exceptions:  None    H, Ricardo Mendoza MD, FACS, FASCRS  Staff Surgeon  Dept of Colon and Rectal Surgery  [facility] [place] [place]

## 2018-01-01 ENCOUNTER — OFFICE VISIT (OUTPATIENT)
Dept: SURGERY | Facility: CLINIC | Age: 33
End: 2018-01-01
Payer: MEDICAID

## 2018-01-01 ENCOUNTER — PATIENT MESSAGE (OUTPATIENT)
Dept: SURGERY | Facility: CLINIC | Age: 33
End: 2018-01-01

## 2018-01-01 ENCOUNTER — TELEPHONE (OUTPATIENT)
Dept: SURGERY | Facility: CLINIC | Age: 33
End: 2018-01-01

## 2018-01-01 ENCOUNTER — HOSPITAL ENCOUNTER (OUTPATIENT)
Facility: HOSPITAL | Age: 33
Discharge: HOME | End: 2018-06-14
Attending: COLON & RECTAL SURGERY | Admitting: COLON & RECTAL SURGERY
Payer: MEDICAID

## 2018-01-01 ENCOUNTER — SURGERY (OUTPATIENT)
Age: 33
End: 2018-01-01

## 2018-01-01 ENCOUNTER — HOSPITAL ENCOUNTER (OUTPATIENT)
Dept: RADIOLOGY | Facility: HOSPITAL | Age: 33
Discharge: HOME | End: 2018-06-05
Attending: COLON & RECTAL SURGERY
Payer: MEDICAID

## 2018-01-01 ENCOUNTER — ANESTHESIA (OUTPATIENT)
Dept: SURGERY | Facility: HOSPITAL | Age: 33
End: 2018-01-01
Payer: MEDICAID

## 2018-01-01 ENCOUNTER — ANESTHESIA EVENT (OUTPATIENT)
Dept: SURGERY | Facility: HOSPITAL | Age: 33
End: 2018-01-01
Payer: MEDICAID

## 2018-01-01 ENCOUNTER — TELEPHONE (OUTPATIENT)
Dept: GASTROENTEROLOGY | Facility: CLINIC | Age: 33
End: 2018-01-01

## 2018-01-01 ENCOUNTER — INITIAL CONSULT (OUTPATIENT)
Dept: GASTROENTEROLOGY | Facility: CLINIC | Age: 33
End: 2018-01-01
Payer: MEDICAID

## 2018-01-01 VITALS
DIASTOLIC BLOOD PRESSURE: 45 MMHG | HEIGHT: 67 IN | RESPIRATION RATE: 18 BRPM | BODY MASS INDEX: 20.37 KG/M2 | DIASTOLIC BLOOD PRESSURE: 59 MMHG | HEART RATE: 99 BPM | TEMPERATURE: 98 F | SYSTOLIC BLOOD PRESSURE: 92 MMHG | SYSTOLIC BLOOD PRESSURE: 75 MMHG | RESPIRATION RATE: 18 BRPM | HEART RATE: 72 BPM | OXYGEN SATURATION: 100 %

## 2018-01-01 VITALS
HEART RATE: 95 BPM | HEIGHT: 67 IN | WEIGHT: 130.06 LBS | DIASTOLIC BLOOD PRESSURE: 57 MMHG | SYSTOLIC BLOOD PRESSURE: 90 MMHG | BODY MASS INDEX: 20.41 KG/M2

## 2018-01-01 VITALS — SYSTOLIC BLOOD PRESSURE: 103 MMHG | DIASTOLIC BLOOD PRESSURE: 84 MMHG | HEART RATE: 95 BPM

## 2018-01-01 VITALS — DIASTOLIC BLOOD PRESSURE: 59 MMHG | HEART RATE: 86 BPM | SYSTOLIC BLOOD PRESSURE: 98 MMHG

## 2018-01-01 DIAGNOSIS — K60.3 TRANSSPHINCTERIC ANAL FISTULA: ICD-10-CM

## 2018-01-01 DIAGNOSIS — K61.2 ABSCESS OF ANAL AND RECTAL REGIONS: ICD-10-CM

## 2018-01-01 DIAGNOSIS — R19.09 PRESACRAL MASS: ICD-10-CM

## 2018-01-01 DIAGNOSIS — D64.9 ANEMIA, UNSPECIFIED TYPE: ICD-10-CM

## 2018-01-01 DIAGNOSIS — K60.5 ANORECTAL FISTULA: ICD-10-CM

## 2018-01-01 DIAGNOSIS — R19.09 PRESACRAL MASS: Primary | ICD-10-CM

## 2018-01-01 DIAGNOSIS — R19.7 DIARRHEA, UNSPECIFIED TYPE: Primary | ICD-10-CM

## 2018-01-01 DIAGNOSIS — K62.5 RECTAL BLEEDING: Primary | ICD-10-CM

## 2018-01-01 DIAGNOSIS — G11.11 FRIEDREICH'S ATAXIA: Chronic | ICD-10-CM

## 2018-01-01 DIAGNOSIS — D50.8 OTHER IRON DEFICIENCY ANEMIA: ICD-10-CM

## 2018-01-01 DIAGNOSIS — K60.3 TRANSSPHINCTERIC ANAL FISTULA: Primary | ICD-10-CM

## 2018-01-01 DIAGNOSIS — A09 DIARRHEA OF INFECTIOUS ORIGIN: Primary | ICD-10-CM

## 2018-01-01 LAB
ACID FAST MOD KINY STN SPEC: NORMAL
ACID FAST MOD KINY STN SPEC: NORMAL
BACTERIA SPEC AEROBE CULT: NO GROWTH
BACTERIA SPEC ANAEROBE CULT: NORMAL
FUNGUS SPEC CULT: NORMAL
GRAM STN SPEC: NORMAL
GRAM STN SPEC: NORMAL
MYCOBACTERIUM SPEC QL CULT: NORMAL
MYCOBACTERIUM SPEC QL CULT: NORMAL

## 2018-01-01 PROCEDURE — 87070 CULTURE OTHR SPECIMN AEROBIC: CPT

## 2018-01-01 PROCEDURE — 01120 ANES PX ON BONY PELVIS: CPT | Performed by: COLON & RECTAL SURGERY

## 2018-01-01 PROCEDURE — 25000003 PHARM REV CODE 250: Performed by: STUDENT IN AN ORGANIZED HEALTH CARE EDUCATION/TRAINING PROGRAM

## 2018-01-01 PROCEDURE — 63600175 PHARM REV CODE 636 W HCPCS: Performed by: NURSE PRACTITIONER

## 2018-01-01 PROCEDURE — 99213 OFFICE O/P EST LOW 20 MIN: CPT | Mod: PBBFAC,25 | Performed by: COLON & RECTAL SURGERY

## 2018-01-01 PROCEDURE — 25000003 PHARM REV CODE 250: Performed by: ANESTHESIOLOGY

## 2018-01-01 PROCEDURE — 99999 PR PBB SHADOW E&M-EST. PATIENT-LVL III: CPT | Mod: PBBFAC,,, | Performed by: COLON & RECTAL SURGERY

## 2018-01-01 PROCEDURE — 71000015 HC POSTOP RECOV 1ST HR: Performed by: COLON & RECTAL SURGERY

## 2018-01-01 PROCEDURE — 72197 MRI PELVIS W/O & W/DYE: CPT | Mod: TC

## 2018-01-01 PROCEDURE — 99203 OFFICE O/P NEW LOW 30 MIN: CPT | Mod: S$GLB,,, | Performed by: NURSE PRACTITIONER

## 2018-01-01 PROCEDURE — 87116 MYCOBACTERIA CULTURE: CPT

## 2018-01-01 PROCEDURE — 25000003 PHARM REV CODE 250: Performed by: COLON & RECTAL SURGERY

## 2018-01-01 PROCEDURE — 99024 POSTOP FOLLOW-UP VISIT: CPT | Mod: ,,, | Performed by: COLON & RECTAL SURGERY

## 2018-01-01 PROCEDURE — 87076 CULTURE ANAEROBE IDENT EACH: CPT

## 2018-01-01 PROCEDURE — C9290 INJ, BUPIVACAINE LIPOSOME: HCPCS | Performed by: COLON & RECTAL SURGERY

## 2018-01-01 PROCEDURE — 99213 OFFICE O/P EST LOW 20 MIN: CPT | Mod: PBBFAC | Performed by: COLON & RECTAL SURGERY

## 2018-01-01 PROCEDURE — 71000044 HC DOSC ROUTINE RECOVERY FIRST HOUR: Performed by: COLON & RECTAL SURGERY

## 2018-01-01 PROCEDURE — 71000045 HC DOSC ROUTINE RECOVERY EA ADD'L HR: Performed by: COLON & RECTAL SURGERY

## 2018-01-01 PROCEDURE — 87206 SMEAR FLUORESCENT/ACID STAI: CPT

## 2018-01-01 PROCEDURE — 27201037 HC PRESSURE MONITORING SET UP

## 2018-01-01 PROCEDURE — 25500020 PHARM REV CODE 255: Performed by: COLON & RECTAL SURGERY

## 2018-01-01 PROCEDURE — 63600175 PHARM REV CODE 636 W HCPCS: Performed by: COLON & RECTAL SURGERY

## 2018-01-01 PROCEDURE — 71000033 HC RECOVERY, INTIAL HOUR: Performed by: COLON & RECTAL SURGERY

## 2018-01-01 PROCEDURE — 37000008 HC ANESTHESIA 1ST 15 MINUTES: Performed by: COLON & RECTAL SURGERY

## 2018-01-01 PROCEDURE — 25000003 PHARM REV CODE 250: Performed by: NURSE PRACTITIONER

## 2018-01-01 PROCEDURE — S0030 INJECTION, METRONIDAZOLE: HCPCS | Performed by: STUDENT IN AN ORGANIZED HEALTH CARE EDUCATION/TRAINING PROGRAM

## 2018-01-01 PROCEDURE — 72197 MRI PELVIS W/O & W/DYE: CPT | Mod: 26,,, | Performed by: RADIOLOGY

## 2018-01-01 PROCEDURE — 63600175 PHARM REV CODE 636 W HCPCS: Performed by: NURSE ANESTHETIST, CERTIFIED REGISTERED

## 2018-01-01 PROCEDURE — A4216 STERILE WATER/SALINE, 10 ML: HCPCS | Performed by: STUDENT IN AN ORGANIZED HEALTH CARE EDUCATION/TRAINING PROGRAM

## 2018-01-01 PROCEDURE — 27080 REMOVAL OF TAIL BONE: CPT | Mod: ,,, | Performed by: COLON & RECTAL SURGERY

## 2018-01-01 PROCEDURE — 99213 OFFICE O/P EST LOW 20 MIN: CPT | Mod: S$PBB,,, | Performed by: COLON & RECTAL SURGERY

## 2018-01-01 PROCEDURE — 87205 SMEAR GRAM STAIN: CPT

## 2018-01-01 PROCEDURE — 88311 DECALCIFY TISSUE: CPT | Mod: 26,,, | Performed by: PATHOLOGY

## 2018-01-01 PROCEDURE — 88305 TISSUE EXAM BY PATHOLOGIST: CPT | Performed by: PATHOLOGY

## 2018-01-01 PROCEDURE — 36000705 HC OR TIME LEV I EA ADD 15 MIN: Performed by: COLON & RECTAL SURGERY

## 2018-01-01 PROCEDURE — 37000009 HC ANESTHESIA EA ADD 15 MINS: Performed by: COLON & RECTAL SURGERY

## 2018-01-01 PROCEDURE — 36000704 HC OR TIME LEV I 1ST 15 MIN: Performed by: COLON & RECTAL SURGERY

## 2018-01-01 PROCEDURE — 88305 TISSUE EXAM BY PATHOLOGIST: CPT | Mod: 26,,, | Performed by: PATHOLOGY

## 2018-01-01 PROCEDURE — 87075 CULTR BACTERIA EXCEPT BLOOD: CPT

## 2018-01-01 PROCEDURE — 25000003 PHARM REV CODE 250

## 2018-01-01 PROCEDURE — 87102 FUNGUS ISOLATION CULTURE: CPT

## 2018-01-01 PROCEDURE — D9220A PRA ANESTHESIA: Mod: ,,, | Performed by: ANESTHESIOLOGY

## 2018-01-01 PROCEDURE — 46050 I&D PERIANAL ABSCESS SUPFC: CPT | Mod: 51,,, | Performed by: COLON & RECTAL SURGERY

## 2018-01-01 PROCEDURE — A9585 GADOBUTROL INJECTION: HCPCS | Performed by: COLON & RECTAL SURGERY

## 2018-01-01 PROCEDURE — 63600175 PHARM REV CODE 636 W HCPCS: Performed by: STUDENT IN AN ORGANIZED HEALTH CARE EDUCATION/TRAINING PROGRAM

## 2018-01-01 PROCEDURE — 27201423 OPTIME MED/SURG SUP & DEVICES STERILE SUPPLY: Performed by: COLON & RECTAL SURGERY

## 2018-01-01 RX ORDER — SODIUM CHLORIDE 9 MG/ML
INJECTION, SOLUTION INTRAVENOUS CONTINUOUS
Status: DISCONTINUED | OUTPATIENT
Start: 2018-01-01 | End: 2018-01-01 | Stop reason: HOSPADM

## 2018-01-01 RX ORDER — OXYCODONE AND ACETAMINOPHEN 5; 325 MG/1; MG/1
1 TABLET ORAL EVERY 4 HOURS PRN
Qty: 50 TABLET | Refills: 0 | Status: SHIPPED | OUTPATIENT
Start: 2018-01-01

## 2018-01-01 RX ORDER — FERROUS SULFATE 325(65) MG
325 TABLET, DELAYED RELEASE (ENTERIC COATED) ORAL 2 TIMES DAILY
Status: CANCELLED | OUTPATIENT
Start: 2018-01-01

## 2018-01-01 RX ORDER — OXYCODONE AND ACETAMINOPHEN 5; 325 MG/1; MG/1
1 TABLET ORAL EVERY 4 HOURS PRN
Status: CANCELLED | OUTPATIENT
Start: 2018-01-01

## 2018-01-01 RX ORDER — METRONIDAZOLE 500 MG/100ML
INJECTION, SOLUTION INTRAVENOUS
Status: DISCONTINUED | OUTPATIENT
Start: 2018-01-01 | End: 2018-01-01

## 2018-01-01 RX ORDER — GADOBUTROL 604.72 MG/ML
6 INJECTION INTRAVENOUS
Status: COMPLETED | OUTPATIENT
Start: 2018-01-01 | End: 2018-01-01

## 2018-01-01 RX ORDER — CARVEDILOL 3.12 MG/1
3.12 TABLET ORAL 2 TIMES DAILY
Status: CANCELLED | OUTPATIENT
Start: 2018-01-01

## 2018-01-01 RX ORDER — BACITRACIN 50000 [IU]/1
INJECTION, POWDER, FOR SOLUTION INTRAMUSCULAR
Status: DISCONTINUED | OUTPATIENT
Start: 2018-01-01 | End: 2018-01-01 | Stop reason: HOSPADM

## 2018-01-01 RX ORDER — OXYCODONE AND ACETAMINOPHEN 10; 325 MG/1; MG/1
1 TABLET ORAL EVERY 4 HOURS PRN
Status: CANCELLED | OUTPATIENT
Start: 2018-01-01

## 2018-01-01 RX ORDER — LIDOCAINE HYDROCHLORIDE 10 MG/ML
1 INJECTION, SOLUTION EPIDURAL; INFILTRATION; INTRACAUDAL; PERINEURAL ONCE
Status: COMPLETED | OUTPATIENT
Start: 2018-01-01 | End: 2018-01-01

## 2018-01-01 RX ORDER — BUPIVACAINE HYDROCHLORIDE 2.5 MG/ML
INJECTION, SOLUTION EPIDURAL; INFILTRATION; INTRACAUDAL
Status: DISCONTINUED | OUTPATIENT
Start: 2018-01-01 | End: 2018-01-01 | Stop reason: HOSPADM

## 2018-01-01 RX ORDER — HEPARIN SODIUM 5000 [USP'U]/ML
5000 INJECTION, SOLUTION INTRAVENOUS; SUBCUTANEOUS
Status: COMPLETED | OUTPATIENT
Start: 2018-01-01 | End: 2018-01-01

## 2018-01-01 RX ORDER — AMOXICILLIN 250 MG
1 CAPSULE ORAL DAILY
Status: CANCELLED | OUTPATIENT
Start: 2018-01-01

## 2018-01-01 RX ORDER — FUROSEMIDE 20 MG/1
40 TABLET ORAL 2 TIMES DAILY
Status: CANCELLED | OUTPATIENT
Start: 2018-01-01

## 2018-01-01 RX ORDER — ONDANSETRON 4 MG/1
4 TABLET, ORALLY DISINTEGRATING ORAL EVERY 6 HOURS PRN
Status: CANCELLED | OUTPATIENT
Start: 2018-01-01

## 2018-01-01 RX ORDER — DIPHENHYDRAMINE HYDROCHLORIDE 50 MG/ML
12.5 INJECTION INTRAMUSCULAR; INTRAVENOUS EVERY 6 HOURS PRN
Status: CANCELLED | OUTPATIENT
Start: 2018-01-01

## 2018-01-01 RX ORDER — MUPIROCIN 20 MG/G
OINTMENT TOPICAL
Status: DISCONTINUED | OUTPATIENT
Start: 2018-01-01 | End: 2018-01-01 | Stop reason: HOSPADM

## 2018-01-01 RX ORDER — FENTANYL CITRATE 50 UG/ML
INJECTION, SOLUTION INTRAMUSCULAR; INTRAVENOUS
Status: DISCONTINUED | OUTPATIENT
Start: 2018-01-01 | End: 2018-01-01

## 2018-01-01 RX ORDER — POLYETHYLENE GLYCOL 3350 17 G/17G
17 POWDER, FOR SOLUTION ORAL DAILY
Status: CANCELLED | OUTPATIENT
Start: 2018-01-01

## 2018-01-01 RX ORDER — MIDAZOLAM HYDROCHLORIDE 1 MG/ML
INJECTION, SOLUTION INTRAMUSCULAR; INTRAVENOUS
Status: DISCONTINUED | OUTPATIENT
Start: 2018-01-01 | End: 2018-01-01

## 2018-01-01 RX ORDER — PANTOPRAZOLE SODIUM 40 MG/1
40 TABLET, DELAYED RELEASE ORAL DAILY
Status: CANCELLED | OUTPATIENT
Start: 2018-01-01

## 2018-01-01 RX ORDER — METRONIDAZOLE 500 MG/1
500 TABLET ORAL EVERY 8 HOURS
Qty: 42 TABLET | Refills: 1 | Status: ON HOLD | OUTPATIENT
Start: 2018-01-01 | End: 2018-01-01 | Stop reason: HOSPADM

## 2018-01-01 RX ORDER — OXYCODONE AND ACETAMINOPHEN 5; 325 MG/1; MG/1
1 TABLET ORAL EVERY 4 HOURS PRN
Qty: 50 TABLET | Refills: 0 | Status: SHIPPED | OUTPATIENT
Start: 2018-01-01 | End: 2018-01-01 | Stop reason: SDUPTHER

## 2018-01-01 RX ORDER — ACETAMINOPHEN 10 MG/ML
1000 INJECTION, SOLUTION INTRAVENOUS
Status: COMPLETED | OUTPATIENT
Start: 2018-01-01 | End: 2018-01-01

## 2018-01-01 RX ORDER — DEXMEDETOMIDINE HYDROCHLORIDE 100 UG/ML
INJECTION, SOLUTION INTRAVENOUS
Status: DISCONTINUED | OUTPATIENT
Start: 2018-01-01 | End: 2018-01-01

## 2018-01-01 RX ORDER — SODIUM CHLORIDE 9 MG/ML
INJECTION, SOLUTION INTRAVENOUS CONTINUOUS PRN
Status: DISCONTINUED | OUTPATIENT
Start: 2018-01-01 | End: 2018-01-01

## 2018-01-01 RX ORDER — IPRATROPIUM BROMIDE AND ALBUTEROL SULFATE 2.5; .5 MG/3ML; MG/3ML
3 SOLUTION RESPIRATORY (INHALATION) EVERY 6 HOURS PRN
Status: CANCELLED | OUTPATIENT
Start: 2018-01-01

## 2018-01-01 RX ORDER — MOXIFLOXACIN HYDROCHLORIDE 400 MG/1
400 TABLET ORAL DAILY
Qty: 14 TABLET | Refills: 1 | Status: ON HOLD | OUTPATIENT
Start: 2018-01-01 | End: 2018-01-01 | Stop reason: HOSPADM

## 2018-01-01 RX ORDER — MUPIROCIN 20 MG/G
OINTMENT TOPICAL
Status: CANCELLED | OUTPATIENT
Start: 2018-01-01

## 2018-01-01 RX ORDER — POLYETHYLENE GLYCOL 3350 17 G/17G
17 POWDER, FOR SOLUTION ORAL DAILY
Qty: 510 G | Refills: 3 | Status: SHIPPED | OUTPATIENT
Start: 2018-01-01

## 2018-01-01 RX ORDER — ONDANSETRON 8 MG/1
8 TABLET, ORALLY DISINTEGRATING ORAL EVERY 6 HOURS PRN
Qty: 30 TABLET | Refills: 1 | Status: SHIPPED | OUTPATIENT
Start: 2018-01-01

## 2018-01-01 RX ORDER — PHENYLEPHRINE HCL IN 0.9% NACL 1 MG/10 ML
100 SYRINGE (ML) INTRAVENOUS EVERY 5 MIN PRN
Status: DISCONTINUED | OUTPATIENT
Start: 2018-01-01 | End: 2018-01-01 | Stop reason: HOSPADM

## 2018-01-01 RX ORDER — PHENYLEPHRINE HYDROCHLORIDE 10 MG/ML
INJECTION INTRAVENOUS
Status: DISCONTINUED | OUTPATIENT
Start: 2018-01-01 | End: 2018-01-01

## 2018-01-01 RX ORDER — KETAMINE HCL IN 0.9 % NACL 50 MG/5 ML
SYRINGE (ML) INTRAVENOUS
Status: DISCONTINUED | OUTPATIENT
Start: 2018-01-01 | End: 2018-01-01

## 2018-01-01 RX ORDER — ACETAMINOPHEN 10 MG/ML
1000 INJECTION, SOLUTION INTRAVENOUS
Status: CANCELLED | OUTPATIENT
Start: 2018-01-01 | End: 2018-01-01

## 2018-01-01 RX ORDER — SODIUM CHLORIDE 9 MG/ML
INJECTION, SOLUTION INTRAVENOUS CONTINUOUS
Status: CANCELLED | OUTPATIENT
Start: 2018-01-01

## 2018-01-01 RX ORDER — HEPARIN SODIUM 5000 [USP'U]/ML
5000 INJECTION, SOLUTION INTRAVENOUS; SUBCUTANEOUS
Status: CANCELLED | OUTPATIENT
Start: 2018-01-01

## 2018-01-01 RX ORDER — PHENYLEPHRINE HCL IN 0.9% NACL 1 MG/10 ML
SYRINGE (ML) INTRAVENOUS
Status: COMPLETED
Start: 2018-01-01 | End: 2018-01-01

## 2018-01-01 RX ADMIN — DEXMEDETOMIDINE HYDROCHLORIDE 0.7 MCG/KG/HR: 100 INJECTION, SOLUTION, CONCENTRATE INTRAVENOUS at 02:06

## 2018-01-01 RX ADMIN — SODIUM CHLORIDE: 0.9 INJECTION, SOLUTION INTRAVENOUS at 01:06

## 2018-01-01 RX ADMIN — GADOBUTROL 6 ML: 604.72 INJECTION INTRAVENOUS at 06:06

## 2018-01-01 RX ADMIN — CEFTRIAXONE 2 G: 1 INJECTION, SOLUTION INTRAVENOUS at 02:06

## 2018-01-01 RX ADMIN — Medication 30 MG: at 02:06

## 2018-01-01 RX ADMIN — SODIUM CHLORIDE: 0.9 INJECTION, SOLUTION INTRAVENOUS at 02:06

## 2018-01-01 RX ADMIN — Medication 100 MCG: at 04:06

## 2018-01-01 RX ADMIN — MIDAZOLAM HYDROCHLORIDE 2 MG: 1 INJECTION, SOLUTION INTRAMUSCULAR; INTRAVENOUS at 02:06

## 2018-01-01 RX ADMIN — LIDOCAINE HYDROCHLORIDE 0.1 MG: 10 INJECTION, SOLUTION EPIDURAL; INFILTRATION; INTRACAUDAL; PERINEURAL at 01:06

## 2018-01-01 RX ADMIN — FENTANYL CITRATE 50 MCG: 50 INJECTION, SOLUTION INTRAMUSCULAR; INTRAVENOUS at 02:06

## 2018-01-01 RX ADMIN — BUPIVACAINE 20 ML: 13.3 INJECTION, SUSPENSION, LIPOSOMAL INFILTRATION at 03:06

## 2018-01-01 RX ADMIN — PHENYLEPHRINE HYDROCHLORIDE 200 MCG: 10 INJECTION INTRAVENOUS at 03:06

## 2018-01-01 RX ADMIN — IBUPROFEN 800 MG: 800 INJECTION INTRAVENOUS at 01:06

## 2018-01-01 RX ADMIN — HEPARIN SODIUM 5000 UNITS: 5000 INJECTION, SOLUTION INTRAVENOUS; SUBCUTANEOUS at 01:06

## 2018-01-01 RX ADMIN — ACETAMINOPHEN 1000 MG: 10 INJECTION, SOLUTION INTRAVENOUS at 02:06

## 2018-01-01 RX ADMIN — BACITRACIN 150000 UNITS: 50000 INJECTION, POWDER, LYOPHILIZED, FOR SOLUTION INTRAMUSCULAR at 04:06

## 2018-01-01 RX ADMIN — METRONIDAZOLE 500 MG: 500 SOLUTION INTRAVENOUS at 02:06

## 2018-01-01 RX ADMIN — MUPIROCIN: 20 OINTMENT TOPICAL at 01:06

## 2018-01-01 RX ADMIN — DEXMEDETOMIDINE HYDROCHLORIDE 40 MCG: 100 INJECTION, SOLUTION, CONCENTRATE INTRAVENOUS at 02:06

## 2018-01-01 RX ADMIN — BUPIVACAINE HYDROCHLORIDE 30 ML: 2.5 INJECTION, SOLUTION EPIDURAL; INFILTRATION; INTRACAUDAL; PERINEURAL at 03:06

## 2018-05-18 PROBLEM — D50.0 BLOOD LOSS ANEMIA: Status: ACTIVE | Noted: 2018-01-01

## 2018-05-18 PROBLEM — D50.9 IRON DEFICIENCY ANEMIA: Status: ACTIVE | Noted: 2018-01-01

## 2018-05-18 PROBLEM — K60.3 ANAL FISTULA: Status: RESOLVED | Noted: 2017-09-14 | Resolved: 2018-01-01

## 2018-05-31 NOTE — TELEPHONE ENCOUNTER
----- Message from Nola León sent at 5/31/2018  2:08 PM CDT -----  Contact: ebonie johnson - [phone]  tim - is asking for appt for fistula - please call ebonie Mccullough [phone]

## 2018-05-31 NOTE — TELEPHONE ENCOUNTER
Pt has another fistula which is draining a greenish liquid. She says it has an odor to it now. Appt made for him to see Dr Reji Quinonez.

## 2018-06-05 NOTE — PROGRESS NOTES
"        CRS Office Visit    SUBJECTIVE:     Chief Complaint: f/u complex cyst     History of Present Illness:   Patient is a 33 y.o. male patient with Alma's ataxia, cardiomyopathy, atrial fibrillation on Eliquis and complex fistula and presacral fluid collection s/p drainage in Nov. 2017 (sterile fluid, bone bx neg for malignancy) who presents for follow up.   Now with increased drainage of perianal wound.  Some pain.  No fever    Review of patient's allergies indicates: No Known Allergies    Past Medical History:   Diagnosis Date    Atrial fibrillation     Cardiomyopathy     CHF (congestive heart failure)     Friedreich's ataxia     General anesthetics causing adverse effect in therapeutic use     Nystagmus     Scoliosis      Past Surgical History:   Procedure Laterality Date    acf      mutliple perirectal abscess      dec 2015    perianal abscess  02/2015    POSTERIOR CERVICAL LAMINECTOMY       Family History   Problem Relation Age of Onset    ADD / ADHD Mother     ADD / ADHD Sister     ADD / ADHD Sister      Social History   Substance Use Topics    Smoking status: Current Every Day Smoker     Packs/day: 0.50     Years: 13.00     Types: Cigarettes    Smokeless tobacco: Current User    Alcohol use No        OBJECTIVE:     Vital Signs (Most Recent)    BP (!) 90/57   Pulse 95   Ht 5' 7.01" (1.702 m)   Wt 59 kg (130 lb 1.1 oz)   BMI 20.37 kg/m²       Physical Exam:  General: White male in NAD sitting in his wheelchair in clinic  Vital signs: reviewed above.   Speech/Language: intact  Head: normocephalic, atraumatic   Neurological: E4V5M6, Pupils equal & round, EOMI, Moves all extremities spontaneously  Cardiovascular: RRR, Distal pulses intact, Cap refill < 3s  Respiratory: Non-distressed on RA, symmetric chest expansion, no stridor  Abdomen: soft, non-tender, non-distended   Skin: Warm, Dry, intact        No fluctuance, tenderness or induration.  Digital exam no masses, good sphincter " tone      Echo (10/27/2017):  - EF of 10-15%  - Hypokinesis of inferior wall  - Trace mitral regurgitation    SPECIMEN  1) Sacral bone biopsy. (2017-11-13)  - Benign bone.  - Negative for infection or malignancy.      ASSESSMENT/PLAN:    No evidence of residual abscess   External opening consistent with fistula     Plan  Begin empiric antibiotic therapy  Check MRI to evaluate cyst, fistula  Office visit 1  Month    Addendum  MRI shows abscess/ phlegmon of presacral area  Will recommend EUA

## 2018-06-05 NOTE — LETTER
June 6, 2018      Gail Hester MD  [address] [facility]  [place]  [place] [address]           [facility]  [address]  [facility] [address]  Phone: [phone]          Patient: Vinayak Castrejon   MR Number: [id number]   YOB: 1985   Date of Visit: 6/5/2018       Dear Dr. Gail Hester:    Thank you for referring Vinayak Castrejon to me for evaluation. Attached you will find relevant portions of my assessment and plan of care.    If you have questions, please do not hesitate to call me. I look forward to following Vinayak Castrejon along with you.    Sincerely,    SUSHMA Mendoza MD    Enclosure  CC:  No Recipients    If you would like to receive this communication electronically, please contact [email or web address] or [phone] to request more information on [employer] Link access.    For providers and/or their staff who would like to refer a patient to [facility], please contact us through our one-stop-shop provider referral line, [facility], at [phone].    If you feel you have received this communication in error or would no longer like to receive these types of communications, please e-mail [email or web address]

## 2018-06-06 NOTE — LETTER
June 7, 2018      Pallavi Sunkara, MD  [address]  [facility] [address]           [facility]  [facility] , [address]   [place] [address]  Phone: [phone]  Fax: [phone]          Patient: Vinayak Castrejon   MR Number: [id number]   YOB: 1985   Date of Visit: 6/6/2018       Dear Dr. Pallavi Sunkara:    Thank you for referring Vinayak Castrejon to me for evaluation. Attached you will find relevant portions of my assessment and plan of care.    If you have questions, please do not hesitate to call me. I look forward to following Vinayak Castrejon along with you.    Sincerely,    Destini Reyes, CELI    Enclosure  CC:  No Recipients    If you would like to receive this communication electronically, please contact [email or web address] or [phone] to request more information on [employer] Link access.    For providers and/or their staff who would like to refer a patient to [facility], please contact us through our one-stop-shop provider referral line, [facility], at [phone].    If you feel you have received this communication in error or would no longer like to receive these types of communications, please e-mail [email or web address]

## 2018-06-06 NOTE — PROGRESS NOTES
Subjective:       Patient ID: Vinayak Castrejon is a 33 y.o. male.    Chief Complaint: Rectal Bleeding    HPI Presents due to blood per rectum noted with every bowel movement.  Is red blood and some mucous that is coating the stool and noted when wiping.  His mother brings pictures.  Stool appears soft.  Denies straining with bowel movements.  Uses stool softener and miralax daily.  Reports anal rectal pain internally.    He has been followed by colorectal surgery for fistular disease and abcess/cyst with recent office visit and MRI pelvis.  He is on antibiotics.  No bleeding from fistula.    He had blood transfusion last month.  Recent hemoglobin 9.9.  He has been on iron supplement.    9.9 up from 8.5 post transfusion 5/22/2017.    Denies abdominal pain.  No black stools.  No family history of colon cancer, colon polyps, IBD.      He was previously on Eliquis for atrial fibrillation and cardiomyopathy but this has been held by his cardiologist.        He is wheelchair bound with diagnosis of Friedreich's ataxia.    Review of Systems   Constitutional: Negative for activity change, appetite change, fatigue, fever and unexpected weight change.   Respiratory: Negative for shortness of breath.    Cardiovascular: Negative for chest pain.   Gastrointestinal: Positive for anal bleeding, blood in stool, constipation and rectal pain. Negative for abdominal pain, nausea and vomiting.   Psychiatric/Behavioral: Negative.        Objective:      Physical Exam   Constitutional: He is oriented to person, place, and time. He appears well-nourished.   Cardiovascular: Normal rate.    Pulmonary/Chest: Effort normal.   Musculoskeletal:   Wheelchair bound   Neurological: He is alert and oriented to person, place, and time.   Skin: Skin is warm and dry. He is not diaphoretic. No pallor.   Psychiatric: He has a normal mood and affect. His behavior is normal. Judgment and thought content normal.   Vitals reviewed.      Assessment:       1.  Rectal bleeding    2. Other iron deficiency anemia    3. Friedreich's ataxia    4. Anorectal fistula        Plan:         Vinayak was seen today for rectal bleeding.    Diagnoses and all orders for this visit:    Rectal bleeding    Other iron deficiency anemia    Friedreich's ataxia    Anorectal fistula         Have discussed at length with the patient and his mother the option of colonoscopy to assess rectal bleeding.  Currently H/H is stable.  Continues to have antibiotic treatment for resolving fistulas.  Will discuss with gastroenterologist and colorectal surgery further evaluation and timing of colonoscopy in relation to fistula healing if warranted.

## 2018-06-07 NOTE — TELEPHONE ENCOUNTER
Spoke to colorectal surgeon.  Scheduled for I&D on 6/14/2018.    No reason from colorectal treatment and procedure not to proceed with colonoscopy at any date apart from same day as I&D.    Please call patient and let him know that I discussed with Dr. Mendoza and ok to proceed with colonoscopy.  We discussed risks.benefits.procedure at  yesterday.    He wants to schedule at [facility].  I have placed a case request.  Please call and give him the number to schedule.

## 2018-06-13 NOTE — ANESTHESIA PREPROCEDURE EVALUATION
06/13/2018    Anesthesia Pre-operative Evaluation    Pre-operative evaluation for Procedure(s) (LRB):  INCISION AND DRAINAGE, ABSCESS (N/A)    Vinayak Castrejon is a 33 y.o. male with Alma's ataxia, cardiomyopathy (severe biventricular failure, EF 10% with DD), atrial fibrillation (was on Eliquis, but was held due to significant rectal bleeding), and complex fistula and presacral fluid collection s/p drainage in Nov. 2017 (sterile fluid, bone bx neg for malignancy). He has a recurrence of his fistula and perirectal abscess.      Prev airway (5/27/2016):   Placement Date: 05/27/16; Placement Time: 1408; Method of Intubation: Direct laryngoscopy; Inserted by: CRNA; Airway Device: Endotracheal Tube; Mask Ventilation: Easy; Intubated: Postinduction; Blade: Foster #2; Airway Device Size: 7.0; Style: Cuffed; Cuff Inflation: Minimal occlusive pressure; Placement Verified By: Auscultation, Capnometry; Grade: Grade I; Complicating Factors: None; Intubation Findings: Positive EtCO2, Bilateral breath sounds, Atraumatic/Condition of teeth unchanged;  Depth of Insertion: 24; Securment: Lips; Complications: None; Breath Sounds: Equal Bilateral; Insertion Attempts: 1; Removal Date: 05/27/16;  Removal Time: 1543      Patient Active Problem List   Diagnosis    Psychosis    Friedreich's ataxia    Loss of coordination    Chronic atrial fibrillation    Tobacco abuse    Cardiomyopathy due to systemic disease    Osteomyelitis of vertebra, sacral and sacrococcygeal region (CODE)    Presacral mass    Osteomyelitis    Iron deficiency anemia       Review of patient's allergies indicates:  No Known Allergies     No current facility-administered medications on file prior to encounter.      Current Outpatient Prescriptions on File Prior to Encounter   Medication Sig Dispense Refill    bisacodyl (DULCOLAX) 5 mg EC tablet Take  10 mg by mouth 3 (three) times daily as needed for Constipation.      carvedilol (COREG) 3.125 MG tablet Take 3.125 mg by mouth 2 (two) times daily.      ferrous sulfate 325 (65 FE) MG EC tablet Take 1 tablet (325 mg total) by mouth 2 (two) times daily.  0    furosemide (LASIX) 20 MG tablet Take 2 tablets (40 mg total) by mouth 2 (two) times daily. 60 tablet 0    oxycodone-acetaminophen (PERCOCET) 5-325 mg per tablet Take 1 tablet by mouth every 4 (four) hours as needed for Pain. 50 tablet 0    pantoprazole (PROTONIX) 40 MG tablet Take 40 mg by mouth once daily.      polyethylene glycol (MIRALAX) 17 gram PwPk Take by mouth.      quetiapine (SEROQUEL) 50 MG tablet Take 150 mg by mouth 2 (two) times daily.      senna-docusate 8.6-50 mg (PERICOLACE) 8.6-50 mg per tablet Take 1 tablet by mouth once daily.      moxifloxacin (AVELOX) 400 mg tablet Take 1 tablet (400 mg total) by mouth once daily. 14 tablet 1       Past Surgical History:   Procedure Laterality Date    acf      mutliple perirectal abscess      dec 2015    perianal abscess  02/2015    POSTERIOR CERVICAL LAMINECTOMY         Social History     Social History    Marital status: Single     Spouse name: N/A    Number of children: N/A    Years of education: N/A     Occupational History    Not on file.     Social History Main Topics    Smoking status: Current Every Day Smoker     Packs/day: 0.50     Years: 13.00     Types: Cigarettes    Smokeless tobacco: Current User    Alcohol use No    Drug use: Yes     Frequency: 3.0 times per week     Types: Marijuana    Sexual activity: No     Other Topics Concern    Not on file     Social History Narrative    No narrative on file         Vital Signs Range (Last 24H):         CBC: No results for input(s): WBC, RBC, HGB, HCT, PLT, MCV, MCH, MCHC in the last 72 hours.    CMP: No results for input(s): NA, K, CL, CO2, BUN, CREATININE, GLU, MG, PHOS, CALCIUM, ALBUMIN, PROT, ALKPHOS, ALT, AST, BILITOT in the  last 72 hours.    INR  No results for input(s): PT, INR, PROTIME, APTT in the last 72 hours.        Diagnostic Studies:  MRI Pelvis W WO Contrast (6/5/2018:  Persistent right sided perirectal fistula extending into the presacral space with large phlegmon/ fluid collection, as above, increased since the 09/06/2017 exam.  The inflammatory change extends through the pelvis, involving both piriformis and gluteus minimus/ medius musculature.  Persistent osteomyelitis of the adjacent sacrum noted.    EKG (5/22/2018):  Sinus rhythm with Premature atrial complexes with Aberrant conduction  Right ventricular hypertrophy  Nonspecific ST and T wave abnormality    2D Echo (6/30/2016):  CONCLUSIONS     1 - Severely depressed left ventricular systolic function (EF 10-15%).     2 - Left ventricular diastolic dysfunction.     3 - Severely depressed right ventricular systolic function .     4 - Moderate to severe mitral regurgitation.     5 - Moderate to severe tricuspid regurgitation.     6 - The estimated RV systolic pressure is 40 mmHg.     7 - Increased central venous pressure.      Anesthesia Evaluation    I have reviewed the Patient Summary Reports.    I have reviewed the Nursing Notes.      Review of Systems  Social:  Smoker    Hematology/Oncology:     Oncology Normal    -- Anemia:   Cardiovascular:   Denies MI.  Dysrhythmias atrial fibrillation  Denies Angina. CHF ECG has been reviewed.    Pulmonary:  Pulmonary Normal  Denies COPD.  Denies Asthma. Gets sob laying down flat sometimes   Renal/:   Denies Chronic Renal Disease.     Hepatic/GI:   Denies Liver Disease.    Neurological:   Alma's ataxia   Endocrine:   Denies Diabetes.    Psych:   Psychiatric History          Physical Exam  General:  Well nourished    Airway/Jaw/Neck:  Airway Findings: Mouth Opening: Normal Tongue: Normal  General Airway Assessment: Adult, Average  Mallampati: II  TM Distance: Normal, at least 6 cm  Jaw/Neck Findings:  Neck ROM: Normal ROM             Mental Status:  Mental Status Findings:  Cooperative, Alert and Oriented         Anesthesia Plan  Type of Anesthesia, risks & benefits discussed:  Anesthesia Type:  general  Patient's Preference:   Intra-op Monitoring Plan:   Intra-op Monitoring Plan Comments:   Post Op Pain Control Plan:   Post Op Pain Control Plan Comments: As per surgeon's plan  Induction:   IV  Beta Blocker:  Patient is not currently on a Beta-Blocker (No further documentation required).       Informed Consent: Patient understands risks and agrees with Anesthesia plan.  Questions answered. Anesthesia consent signed with patient.  ASA Score: 3     Day of Surgery Review of History & Physical:    H&P update referred to the surgeon.         Ready For Surgery From Anesthesia Perspective.    FRIEDREICH'S ATAXIA - NO REPORTED ANESTHESIA COMPLICATIONS IN PAST SURGERIES  WC BOUND

## 2018-06-13 NOTE — TELEPHONE ENCOUNTER
----- Message from Hamida Mcwilliams sent at 6/13/2018  1:48 PM CDT -----  Contact: Pt mother Mariah  Calling to see when pt has to be there on tomorrow says she did not get a phone call about the procedure      Mr Lemus can be reached at [phone] thanks

## 2018-06-13 NOTE — PRE-PROCEDURE INSTRUCTIONS
LM FOR NANCY MCCLOUD LPN WITH DR. ROACH - NOTIFIED THAT PT WAS UNABLE TO HAVE PRESCRIPTION FILLED FOR PRE-OP ANTIBIOTIC - MOXIFLOXICIN 400 mg q d - Not covered by insurance.

## 2018-06-13 NOTE — PRE-PROCEDURE INSTRUCTIONS
PreOp Instructions given TO PT'S MOTHERZAHIRA:     - Verbal medication information (what to hold and what to take)   - NPO guidelines   - Arrival place directions given; time to be given the day before procedure by the   Surgeon's Office   - Bathing with antibacterial soap   - Don't wear any jewelry or bring any valuables AM of surgery   - No makeup or moisturizer to face   - No perfume/cologne, powder, lotions or aftershave     Pt.'S MOTHERZAHIRA verbalized understanding.    PT WAS ON ELIQUIS - DISCONTINUED PER CARDIOLOGIST ON 6/5/2018 d/t RECTAL BLEEDING - HOLD UNTIL FURTHER NOTICE    WC BOUND    FRIEDREICH'S ATAXIA - NO KNOWN ANESTHESIA COMPLICATIONS IN PREVIOUS SURGERIES

## 2018-06-14 NOTE — INTERVAL H&P NOTE
The patient has been examined and the H&P has been reviewed:    I concur with the findings and no changes have occurred since H&P was written.    Anesthesia/Surgery risks, benefits and alternative options discussed and understood by patient/family.          Active Hospital Problems    Diagnosis  POA    Presacral mass [R19.09]  Yes      Resolved Hospital Problems    Diagnosis Date Resolved POA   No resolved problems to display.

## 2018-06-14 NOTE — BRIEF OP NOTE
[facility]  Brief Operative Note     SUMMARY     Surgery Date: 6/14/2018     Surgeon(s) and Role:     * SUSHMA Mendoza MD - Primary    Assisting Surgeon: None    Pre-op Diagnosis:  Presacral mass [R19.09]    Post-op Diagnosis:  Post-Op Diagnosis Codes:     * Presacral mass [R19.09]    Procedure(s) (LRB):  INCISION AND DRAINAGE, ABSCESS (N/A)    Anesthesia: General    Description/Findings of the procedure:  No complications, good hemostasis.    Estimated Blood Loss:  Minimal           Specimens:   Specimen (12h ago through future)    Start     Ordered    06/14/18 1529  Specimen to Pathology - Surgery  Once     Comments:  1. Coccyx- permanent      06/14/18 1528          Discharge Note    SUMMARY     Admit Date: 6/14/2018    Discharge Date and Time:  06/14/2018 3:49 PM    Hospital Course (synopsis of major diagnoses, care, treatment, and services provided during the course of the hospital stay): No complications, patient discharged home after routine outpatient surgery.        Final Diagnosis: Post-Op Diagnosis Codes:     * Presacral mass [R19.09]    Disposition: Home or Self Care    Follow Up/Patient Instructions:     Medications:  Reconciled Home Medications:      Medication List      START taking these medications    ondansetron 8 MG Tbdl  Commonly known as:  ZOFRAN-ODT  Take 1 tablet (8 mg total) by mouth every 6 (six) hours as needed.     polyethylene glycol 17 gram/dose powder  Commonly known as:  GLYCOLAX  Take 17 g by mouth once daily.  Replaces:  MIRALAX 17 gram Pwpk        CHANGE how you take these medications    oxyCODONE-acetaminophen 5-325 mg per tablet  Commonly known as:  PERCOCET  Take 1 tablet by mouth every 4 (four) hours as needed.  What changed:  reasons to take this        CONTINUE taking these medications    bisacodyl 5 mg EC tablet  Commonly known as:  DULCOLAX  Take 10 mg by mouth 3 (three) times daily as needed for Constipation.     carvedilol 3.125 MG tablet  Commonly  known as:  COREG  Take 3.125 mg by mouth 2 (two) times daily.     ferrous sulfate 325 (65 FE) MG EC tablet  Take 1 tablet (325 mg total) by mouth 2 (two) times daily.     furosemide 20 MG tablet  Commonly known as:  LASIX  Take 2 tablets (40 mg total) by mouth 2 (two) times daily.     pantoprazole 40 MG tablet  Commonly known as:  PROTONIX  Take 40 mg by mouth once daily.     QUEtiapine 50 MG tablet  Commonly known as:  SEROQUEL  Take 150 mg by mouth 2 (two) times daily.     senna-docusate 8.6-50 mg 8.6-50 mg per tablet  Commonly known as:  PERICOLACE  Take 1 tablet by mouth once daily.        STOP taking these medications    MIRALAX 17 gram Pwpk  Generic drug:  polyethylene glycol  Replaced by:  polyethylene glycol 17 gram/dose powder     moxifloxacin 400 mg tablet  Commonly known as:  AVELOX          No discharge procedures on file.  Follow-up Information     H Ricardo Mendoza MD In 2 weeks.    Specialty:  Colon and Rectal Surgery  Why:  Post-op appointment in 2 weeks  Contact information:  [facility]  [facility] [id number] [phone]

## 2018-06-14 NOTE — TRANSFER OF CARE
Anesthesia Transfer of Care Note    Patient: Vinayak Castrejon    Procedure(s) Performed: Procedure(s) (LRB):  INCISION AND DRAINAGE, ABSCESS (N/A)    Patient location: PACU    Anesthesia Type: general    Transport from OR: Transported from OR on room air with adequate spontaneous ventilation. Transported from OR on 6-10 L/min O2 by face mask with adequate spontaneous ventilation    Post pain: adequate analgesia    Post assessment: no apparent anesthetic complications and tolerated procedure well    Post vital signs: stable    Level of consciousness: awake and alert    Nausea/Vomiting: no nausea/vomiting    Complications: none    Transfer of care protocol was followed      Last vitals:   Visit Vitals  BP (!) 73/59 (BP Location: Left arm, Patient Position: Lying)   Pulse 83   Temp 36.9 °C (98.5 °F) (Oral)   Resp 16   SpO2 95%

## 2018-06-14 NOTE — DISCHARGE INSTRUCTIONS
DAILY- Remove packing and cleanse wound with shower head. Pat dry then clean with quarter strength betadine. Repack with kerlix.      Recovery After Procedural Sedation (Adult)  You have been given medicine by vein to make you sleep during your surgery. This may have included both a pain medicine and sleeping medicine. Most of the effects have worn off. But you may still have some drowsiness for the next 6 to 8 hours.  Home care  Follow these guidelines when you get home:  · For the next 8 hours, you should be watched by a responsible adult. This person should make sure your condition is not getting worse.  · Don't drink any alcohol for the next 24 hours.  · Don't drive, operate dangerous machinery, or make important business or personal decisions during the next 24 hours.  Note: Your healthcare provider may tell you not to take any medicine by mouth for pain or sleep in the next 4 hours. These medicines may react with the medicines you were given in the hospital. This could cause a much stronger response than usual.  Follow-up care  Follow up with your healthcare provider if you are not alert and back to your usual level of activity within 12 hours.  When to seek medical advice  Call your healthcare provider right away if any of these occur:  · Drowsiness gets worse  · Weakness or dizziness gets worse  · Repeated vomiting  · You can't be awakened   Date Last Reviewed: 10/18/2016  © 1457-2297 [employer]. [address] [place] [address], [place] [address]. All rights reserved. This information is not intended as a substitute for professional medical care. Always follow your healthcare professional's instructions.

## 2018-06-14 NOTE — ANESTHESIA RELEASE NOTE
Anesthesia Release from PACU Note    Patient: Vinayak Castrejon    Procedure(s) Performed: Procedure(s) (LRB):  INCISION AND DRAINAGE, ABSCESS (N/A)    Anesthesia type: GEN    Post pain: Adequate analgesia reported    Post assessment: no apparent anesthetic complications, tolerated procedure well and no evidence of recall    Post vital signs: BP (!) 77/51   Pulse 76   Temp 36.8 °C (98.3 °F) (Skin)   Resp 16   SpO2 99%     Level of consciousness: awake, alert and oriented    Nausea/Vomiting: no nausea/no vomiting    Complications: none    Airway Patency: patent    Respiratory: unassisted, spontaneous ventilation, room air    Cardiovascular: stable and blood pressure at baseline    Hydration: euvolemic

## 2018-06-14 NOTE — PLAN OF CARE
Patient and family state they are ready to be discharged. Instructions and prescriptions given to patient and family. Both verbalize understanding. Patient tolerating po liquids with no difficulty. Patient denies pain. Anesthesia consent and surgical consent in chart upon patient's discharge from [facility].

## 2018-06-14 NOTE — ANESTHESIA POSTPROCEDURE EVALUATION
Anesthesia Post Evaluation    Patient: Vinayak Castrejon    Procedure(s) Performed: Procedure(s) (LRB):  INCISION AND DRAINAGE, ABSCESS (N/A)    Final Anesthesia Type: general  Patient location during evaluation: PACU  Patient participation: Yes- Able to Participate  Level of consciousness: awake and alert and oriented  Post-procedure vital signs: reviewed and stable  Pain management: adequate  Airway patency: patent  PONV status at discharge: No PONV  Anesthetic complications: no      Cardiovascular status: stable  Respiratory status: unassisted, spontaneous ventilation and room air  Hydration status: euvolemic  Follow-up not needed.        Visit Vitals  BP (!) 77/51   Pulse 76   Temp 36.8 °C (98.3 °F) (Skin)   Resp 16   SpO2 99%       Pain/Destin Score: Pain Assessment Performed: Yes (6/14/2018 12:59 PM)  Presence of Pain: denies (6/14/2018 12:59 PM)

## 2018-06-15 NOTE — TELEPHONE ENCOUNTER
Told pt's mother to have pt cough and start do deep breathing during the day. Call back if temp goes up later today or in the am.

## 2018-06-15 NOTE — TELEPHONE ENCOUNTER
----- Message from Alva Ze sent at 6/15/2018  1:56 PM CDT -----  Contact: Mother- Mariah- [phone]  Reji- pts mother called and stated following his procedure yesterday the pt started running a fever today of 101.6- would like to discuss to determine if this is ok- please contact pt at [phone]

## 2018-06-15 NOTE — PROGRESS NOTES
Anesthesia called to bedside. Patient's bp 60s/30s. HR wnl. Nasal airway in place. Patient not responsive to sternal rub or vigorous stimulus. PACU nurse called to bedside for Austen push.

## 2018-06-16 NOTE — OP NOTE
DATE OF PROCEDURE:  06/14/2018.    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  The patient   has previously undergone multiple operations at an outside hospital.  He has   evidence on an MRI to have a presacral cyst, which actually may be the source of   the perianal sepsis as there is an apparent tract or connection to the cyst.  I   have had a long discussion with the patient and his mother who has legal   guardianship given the patient's cognitive status.  Unfortunately, definitive   surgery for the presacral cyst is not a reasonable option given biventricular   heart failure and an ejection fraction of 10%.  The cyst extends to the level of   S3 or perhaps a little higher and would likely require both an abdominal as   well as a perineal approach.  Given the limitations of his comorbid conditions,   I have recommended to the mother that we proceed with local drainage, likely   requiring coccygectomy.  The chronic nature of the abscess has led to the   suspicion of osteomyelitis involving the sacrum.  Therefore, I have recommended   aggressive local drainage including coccygectomy.  The mother understands that   most likely this will require an open perineal wound, necessitating local wound   care.  They understand that a chronic unhealed wound may result.    PREOPERATIVE DIAGNOSES:  Presacral cyst, complex perianal abscess fistula   disease.    POSTOPERATIVE DIAGNOSES:  Presacral cyst, complex perianal abscess fistula   disease, no evidence of fistula.    NAME OF PROCEDURE:  Exam under anesthesia, incision and drainage of complex   perianal abscess with coccygectomy and drainage of presacral cyst.    SURGEON:  SUSHMA Mendoza M.D.    ASSISTANT:  Radha Knapp M.D. (RES).    TYPE OF ANESTHESIA:  Monitored anesthesia care, local anesthetic.    ESTIMATED BLOOD LOSS:  20 mL.    DRAINS:  None.    FINDINGS:  There  was an external opening just to the left of midline, which   coursed to the posterior midline and ultimately into the presacral cyst.  There   was no ad pus present, but a large cystic cavity measuring approximately 10   cm in craniocaudal dimension.  To obtain adequate drainage, coccygectomy was   necessary.    SPECIMENS:  Abscess culture, bone culture, the bone was also sent for pathologic   diagnosis.    TECHNIQUE IN DETAIL:  The patient was brought to the Operating Room, positively   identified and placed on the operating table in supine position.  He underwent   deep intravenous sedation and was then positioned in the dorsal lithotomy   position in padded Yellofin stirrups with the sacrum extended beyond the end the   edge of the table.  He was secured to the table.  His perineum and perianal   area and sacral area were prepared and draped in usual fashion.  A critical   timeout was performed.  0.25% Marcaine mixed with Exparel was then infiltrated   in the perianal area and especially overlying the coccyx and the posterior anal   area.  I could identify an external opening just to the left of midline.  There   was no granulation tissue present.  This was gently probed with an S-probe and   noted to course deeply into the posterior midline.  Prior extensive scars were   identified extending laterally over each ischiorectal fossa as well as a radial   type incision over the posterior midline.  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  In any case, I made a   radial incision extending from the overlying deep postanal space and extending   over the coccyx.  Dissection was carried vertically in a radial fashion   overlying the deep postanal space using the Bovie electrocautery.  This was   deepened through the anococcygeal ligament.  I did encounter some   serosanguineous drainage and this was cultured.  The fistula probe did extend   deep to the anococcygeal  ligament.  After opening the space, it was noted to   extend into the presacral space.  Again, access coccygectomy was performed.  The   anococcygeal ligament was detached from the coccyx and the coccyx was freed on   either side using the Bovie.  I then removed the coccyx in pieces using a   rongeur.  After coccygectomy, the specimens were sent for both culture and   pathologic diagnosis.  The cavity was then carefully debrided using curettes.  I   then irrigated the presacral space with a Pulsavac with bacitracin.  150,000   units were mixed with 3 liters and the Pulsavac was used to mechanically   irrigate the wound.  Hemostasis was assured.  This was performed using a   QuikClot.  There was no significant bleeding in the effluent upon irrigation   with saline.  I then packed the wound open with 2-inch quarter strength Betadine   gauze.  The wound was left open and packed open.  A dry gauze dressing was   applied over the wound and held in place with mesh underwear.  Please note that   I incorporated the tract into this midline wound by opening the tract with the   Bovie electrocautery over the S-probe.    The patient was returned to the supine position and transported to the recovery   area in stable condition.      ATUL/HN  dd: 06/16/2018 06:03:13 (CDT)  td: 06/16/2018 07:11:15 (CDT)  Doc ID   #[id number]  Job ID #[id number]    CC:

## 2018-06-28 NOTE — TELEPHONE ENCOUNTER
Patient call received reporting diarrhea.  I spoke with his mother who is the caretaker.  She states that he is not having any abdominal pain or nausea or vomiting.  He had a low-grade temp to 100.2 but has not had any high temperatures.  Otherwise he feels well.  Having loose stool for the last several days.  No blood.      Recommended that we send stool for C diff.  He is to begin Imodium 2 tablets t.i.d.    I reviewed wound care with her.  She has to irrigate the wound with the shower head at least once a day.  She is to use quarter strength Betadine soaked gauze and to pack the wound to the top.  She is to do this once a day.    He is scheduled to return to see me in 5 days.

## 2018-07-03 NOTE — PROGRESS NOTES
HPI:  Vinayak Castrejon is a 33 y.o. male with history of presacral abscess/ cyst s/p coccygectomy and drainage of abscess.  Had diarrhea, C diff + treated with Flagy for one week.  Diarrhea resolved with Flagyl.  Reports having pain when packing the wound but it is less.  Also having pain when he sits.  Spending most of this time in a reclining position      Past Medical History:   Diagnosis Date    Atrial fibrillation     Cardiomyopathy     CHF (congestive heart failure)     Friedreich's ataxia     General anesthetics causing adverse effect in therapeutic use     Nystagmus     Scoliosis         Past Surgical History:   Procedure Laterality Date    acf      INCISION AND DRAINAGE OF ABSCESS N/A 6/14/2018    Procedure: INCISION AND DRAINAGE, ABSCESS;  Surgeon: SUSHMA Mendoza MD;  Location: [facility] OR [address] [place] [address];  Service: Colon and Rectal;  Laterality: N/A;    mutliple perirectal abscess      dec 2015    perianal abscess  02/2015    POSTERIOR CERVICAL LAMINECTOMY         Review of patient's allergies indicates:   Allergen Reactions    Lortab [hydrocodone-acetaminophen] Itching       Family History   Problem Relation Age of Onset    ADD / ADHD Mother     ADD / ADHD Sister     ADD / ADHD Sister        Social History     Social History    Marital status: Single     Spouse name: N/A    Number of children: N/A    Years of education: N/A     Social History Main Topics    Smoking status: Current Every Day Smoker     Packs/day: 0.50     Years: 13.00     Types: Cigarettes    Smokeless tobacco: Current User    Alcohol use No    Drug use: Yes     Frequency: 3.0 times per week     Types: Marijuana    Sexual activity: No     Other Topics Concern    Not on file     Social History Narrative    No narrative on file       ROS:  GENERAL: No fever, chills, fatigability or weight loss.  Integument: No rashes, redness, icterus  CHEST: Denies MARIN, cyanosis, wheezing, cough and sputum production.  CARDIOVASCULAR: Denies  chest pain, PND, orthopnea or reduced exercise tolerance.  GI: Denies abd pain, dysphagia, nausea, vomiting, no hematemesis   : Denies burning on urination, no hematuria, no bacteriuria  MSK: No deformities, swelling, joint pain swelling  Neurologic: No HAs, seizures, weakness, paresthesias, gait problems    PE:  General appearance nontoxic  /84 (Patient Position: Lying, BP Method: Large (Automatic))   Pulse 95     Sclera/ Skin anicteric  AT NC EOMI  Neck supple trachea midline   Chest symmetric, nl excursion, no retractions, breathing comfortably  EXT - no CCE  Neuro:  Mood/ affect nl, alert and oriented x 3, moves all ext's, gait nl    Rectal  Sacral wound    Wound clean, granulating.  Digitally explored.  No undrained areas.               depth of wound craniocaudal - 5 cm.              Assessment:  C diff resolved  Wound healing secondarily    Plan:  Continue local wound care.    Complete flagyl 2 week course  OV 6 weeks

## 2018-07-03 NOTE — LETTER
July 3, 2018      Gail Hester MD  [address] [facility]  [place]  [place] [address]           [facility]  [address]  [facility] [address]  Phone: [phone]          Patient: Vinayak Castrejon   MR Number: [id number]   YOB: 1985   Date of Visit: 7/3/2018       Dear Dr. Gail Hester:    Thank you for referring Vinayak Castrejon to me for evaluation. Attached you will find relevant portions of my assessment and plan of care.    If you have questions, please do not hesitate to call me. I look forward to following Vinayak Castrejon along with you.    Sincerely,    SUSHMA Mendoza MD    Enclosure  CC:  No Recipients    If you would like to receive this communication electronically, please contact [email or web address] or [phone] to request more information on [employer] access.    For providers and/or their staff who would like to refer a patient to [facility], please contact us through our one-stop-shop provider referral line, [facility], at [phone].    If you feel you have received this communication in error or would no longer like to receive these types of communications, please e-mail [email or web address]

## 2018-07-11 PROBLEM — I48.3 TYPICAL ATRIAL FLUTTER: Status: ACTIVE | Noted: 2018-01-01

## 2018-07-12 PROBLEM — I48.4 ATYPICAL ATRIAL FLUTTER: Status: ACTIVE | Noted: 2018-01-01

## 2018-08-14 NOTE — PROGRESS NOTES
HPI:  Vinayak Castrejon is a 33 y.o. male with history of presacral cyst, abscess status post coccygectomy and drainage of abscess.  Wound has healed secondarily.  Mother reports that is 1 cm in size or just her finger tip.  It is much, dramatically, smaller.  No bleeding from the rectum        Past Medical History:   Diagnosis Date    Atrial fibrillation     Cardiomyopathy     CHF (congestive heart failure)     Friedreich's ataxia     General anesthetics causing adverse effect in therapeutic use     Nystagmus     Scoliosis         Past Surgical History:   Procedure Laterality Date    acf      mutliple perirectal abscess      dec 2015    perianal abscess  02/2015    POSTERIOR CERVICAL LAMINECTOMY         Review of patient's allergies indicates:   Allergen Reactions    Lortab [hydrocodone-acetaminophen] Itching       Family History   Problem Relation Age of Onset    ADD / ADHD Mother     ADD / ADHD Sister     ADD / ADHD Sister        Social History     Socioeconomic History    Marital status: Single     Spouse name: Not on file    Number of children: Not on file    Years of education: Not on file    Highest education level: Not on file   Social Needs    Financial resource strain: Not on file    Food insecurity - worry: Not on file    Food insecurity - inability: Not on file    Transportation needs - medical: Not on file    Transportation needs - non-medical: Not on file   Occupational History    Not on file   Tobacco Use    Smoking status: Current Every Day Smoker     Packs/day: 0.50     Years: 13.00     Pack years: 6.50     Types: Cigarettes    Smokeless tobacco: Current User   Substance and Sexual Activity    Alcohol use: No    Drug use: Yes     Frequency: 3.0 times per week     Types: Marijuana    Sexual activity: No   Other Topics Concern    Not on file   Social History Narrative    Not on file       ROS:  GENERAL: No fever, chills, fatigability or weight loss.  Integument: No rashes,  redness, icterus  CHEST: Denies MARIN, cyanosis, wheezing, cough and sputum production.  CARDIOVASCULAR: Denies chest pain, PND, orthopnea or reduced exercise tolerance.  GI: Denies abd pain, dysphagia, nausea, vomiting, no hematemesis   : Denies burning on urination, no hematuria, no bacteriuria  MSK: No deformities, swelling, joint pain swelling  Neurologic: No HAs, seizures, weakness, paresthesias, gait problems    PE:  General appearance well  There were no vitals taken for this visit.  Sclera/ Skin anicteric  AT NC EOMI  Neck supple trachea midline   Chest symmetric, nl excursion, no retractions, breathing comfortably  EXT - no CCE  Neuro:  Mood/ affect nl, alert and oriented x 3, moves all ext's, gait nl    Rectal  Inspection   Wound clean, granulating, shallow      Assessment:  Wound healing secondarily    Plan:  Continue local wound care  OV 6 weeks

## 2018-09-25 NOTE — TELEPHONE ENCOUNTER
----- Message from Juvenal Law sent at 9/25/2018 10:24 AM CDT -----  Contact: Mariah (mother) [phone]  Needs Advice    Reason for call: pt mother stated she received a call stating the appt for 9/25 was canceled and she does not need to reschedule           Communication Preference: Mariah (mother) [phone]    Additional Information:

## 2018-09-25 NOTE — TELEPHONE ENCOUNTER
Mariah called to cancel Vinayak's appt. She said his wound looks great. Dr Mendoza had told her if he was doing fine she could cancel the appt.

## 2018-12-06 PROBLEM — I48.92 ATRIAL FLUTTER WITH RAPID VENTRICULAR RESPONSE: Status: ACTIVE | Noted: 2018-01-01

## 2024-11-01 NOTE — TELEPHONE ENCOUNTER
----- Message from Juvenal Law sent at 6/29/2018 12:21 PM CDT -----  Contact: Pt mother:[phone]  .Needs Advice    Reason for call:Pt mother would like to speak with the nurse in regards to knowing if the pt still needs to take the imodium.       Communication Preference:Pt mother:[phone]  Additional Information:   Dr. Radha Gotti [phone]

## (undated) DEVICE — SYR ONLY LUER LOCK 20CC

## (undated) DEVICE — NDL 22GA X1 1/2 REG BEVEL

## (undated) DEVICE — LUBRICANT SURGILUBE 2 OZ

## (undated) DEVICE — CLIPPER BLADE MOD 4406 (CAREF)

## (undated) DEVICE — SEE MEDLINE ITEM 152543

## (undated) DEVICE — SEE MEDLINE ITEM 154981

## (undated) DEVICE — Device

## (undated) DEVICE — LUBRICANT

## (undated) DEVICE — EVACUATOR WOUND BULB 100CC

## (undated) DEVICE — SUT SILK 2.0 BLK 18

## (undated) DEVICE — NDL HYPO A BEVEL 22X1 1/2

## (undated) DEVICE — SEE MEDLINE ITEM 157148

## (undated) DEVICE — TRAY MINOR GEN SURG

## (undated) DEVICE — SEE MEDLINE ITEM 146417

## (undated) DEVICE — SYR 10CC LUER LOCK

## (undated) DEVICE — ELECTRODE REM PLYHSV RETURN 9

## (undated) DEVICE — PANTIES FEMININE NAPKIN LG/XLG